# Patient Record
Sex: MALE | Race: ASIAN
[De-identification: names, ages, dates, MRNs, and addresses within clinical notes are randomized per-mention and may not be internally consistent; named-entity substitution may affect disease eponyms.]

---

## 2020-07-06 ENCOUNTER — HOSPITAL ENCOUNTER (INPATIENT)
Dept: HOSPITAL 72 - EMR | Age: 75
LOS: 6 days | DRG: 871 | End: 2020-07-12
Payer: COMMERCIAL

## 2020-07-06 VITALS — SYSTOLIC BLOOD PRESSURE: 127 MMHG | DIASTOLIC BLOOD PRESSURE: 81 MMHG

## 2020-07-06 VITALS — HEIGHT: 72 IN | WEIGHT: 179.37 LBS | BODY MASS INDEX: 24.29 KG/M2

## 2020-07-06 VITALS — SYSTOLIC BLOOD PRESSURE: 114 MMHG | DIASTOLIC BLOOD PRESSURE: 86 MMHG

## 2020-07-06 VITALS — DIASTOLIC BLOOD PRESSURE: 67 MMHG | SYSTOLIC BLOOD PRESSURE: 122 MMHG

## 2020-07-06 VITALS — DIASTOLIC BLOOD PRESSURE: 62 MMHG | SYSTOLIC BLOOD PRESSURE: 107 MMHG

## 2020-07-06 VITALS — DIASTOLIC BLOOD PRESSURE: 59 MMHG | SYSTOLIC BLOOD PRESSURE: 102 MMHG

## 2020-07-06 DIAGNOSIS — I11.0: ICD-10-CM

## 2020-07-06 DIAGNOSIS — J44.9: ICD-10-CM

## 2020-07-06 DIAGNOSIS — K56.0: ICD-10-CM

## 2020-07-06 DIAGNOSIS — E87.6: ICD-10-CM

## 2020-07-06 DIAGNOSIS — A41.9: Primary | ICD-10-CM

## 2020-07-06 DIAGNOSIS — J96.01: ICD-10-CM

## 2020-07-06 DIAGNOSIS — E86.0: ICD-10-CM

## 2020-07-06 DIAGNOSIS — I48.0: ICD-10-CM

## 2020-07-06 DIAGNOSIS — Z20.828: ICD-10-CM

## 2020-07-06 DIAGNOSIS — E43: ICD-10-CM

## 2020-07-06 DIAGNOSIS — K63.1: ICD-10-CM

## 2020-07-06 DIAGNOSIS — I51.3: ICD-10-CM

## 2020-07-06 DIAGNOSIS — R65.20: ICD-10-CM

## 2020-07-06 DIAGNOSIS — E11.9: ICD-10-CM

## 2020-07-06 DIAGNOSIS — D64.9: ICD-10-CM

## 2020-07-06 DIAGNOSIS — J18.9: ICD-10-CM

## 2020-07-06 DIAGNOSIS — N40.0: ICD-10-CM

## 2020-07-06 DIAGNOSIS — I50.33: ICD-10-CM

## 2020-07-06 DIAGNOSIS — E83.42: ICD-10-CM
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
## 2020-07-06 LAB
ADD MANUAL DIFF: NO
ALBUMIN SERPL-MCNC: 1.8 G/DL (ref 3.4–5)
ALBUMIN/GLOB SERPL: 0.3 {RATIO} (ref 1–2.7)
ALP SERPL-CCNC: 256 U/L (ref 46–116)
ALT SERPL-CCNC: 33 U/L (ref 12–78)
ANION GAP SERPL CALC-SCNC: 10 MMOL/L (ref 5–15)
APPEARANCE UR: CLEAR
APTT PPP: YELLOW S
AST SERPL-CCNC: 36 U/L (ref 15–37)
BASOPHILS NFR BLD AUTO: 1.5 % (ref 0–2)
BILIRUB SERPL-MCNC: 0.3 MG/DL (ref 0.2–1)
BUN SERPL-MCNC: 22 MG/DL (ref 7–18)
CALCIUM SERPL-MCNC: 8 MG/DL (ref 8.5–10.1)
CHLORIDE SERPL-SCNC: 98 MMOL/L (ref 98–107)
CK MB SERPL-MCNC: 2.2 NG/ML (ref 0–3.6)
CK SERPL-CCNC: 506 U/L (ref 26–308)
CO2 SERPL-SCNC: 25 MMOL/L (ref 21–32)
CREAT SERPL-MCNC: 0.9 MG/DL (ref 0.55–1.3)
EOSINOPHIL NFR BLD AUTO: 1.9 % (ref 0–3)
ERYTHROCYTE [DISTWIDTH] IN BLOOD BY AUTOMATED COUNT: 12.1 % (ref 11.6–14.8)
GLOBULIN SER-MCNC: 5.2 G/DL
GLUCOSE UR STRIP-MCNC: NEGATIVE MG/DL
HCT VFR BLD CALC: 25.4 % (ref 42–52)
HGB BLD-MCNC: 8.5 G/DL (ref 14.2–18)
KETONES UR QL STRIP: NEGATIVE
LEUKOCYTE ESTERASE UR QL STRIP: NEGATIVE
LYMPHOCYTES NFR BLD AUTO: 12 % (ref 20–45)
MCV RBC AUTO: 95 FL (ref 80–99)
MONOCYTES NFR BLD AUTO: 7.1 % (ref 1–10)
NEUTROPHILS NFR BLD AUTO: 77.7 % (ref 45–75)
NITRITE UR QL STRIP: NEGATIVE
PH UR STRIP: 7 [PH] (ref 4.5–8)
PLATELET # BLD: 611 K/UL (ref 150–450)
POTASSIUM SERPL-SCNC: 4.3 MMOL/L (ref 3.5–5.1)
PROT UR QL STRIP: (no result)
RBC # BLD AUTO: 2.67 M/UL (ref 4.7–6.1)
SODIUM SERPL-SCNC: 133 MMOL/L (ref 136–145)
SP GR UR STRIP: 1 (ref 1–1.03)
UROBILINOGEN UR-MCNC: 4 MG/DL (ref 0–1)
WBC # BLD AUTO: 6.6 K/UL (ref 4.8–10.8)

## 2020-07-06 PROCEDURE — 82962 GLUCOSE BLOOD TEST: CPT

## 2020-07-06 PROCEDURE — 74230 X-RAY XM SWLNG FUNCJ C+: CPT

## 2020-07-06 PROCEDURE — 96361 HYDRATE IV INFUSION ADD-ON: CPT

## 2020-07-06 PROCEDURE — 94660 CPAP INITIATION&MGMT: CPT

## 2020-07-06 PROCEDURE — 83880 ASSAY OF NATRIURETIC PEPTIDE: CPT

## 2020-07-06 PROCEDURE — 83735 ASSAY OF MAGNESIUM: CPT

## 2020-07-06 PROCEDURE — 86850 RBC ANTIBODY SCREEN: CPT

## 2020-07-06 PROCEDURE — 87070 CULTURE OTHR SPECIMN AEROBIC: CPT

## 2020-07-06 PROCEDURE — 99291 CRITICAL CARE FIRST HOUR: CPT

## 2020-07-06 PROCEDURE — 74018 RADEX ABDOMEN 1 VIEW: CPT

## 2020-07-06 PROCEDURE — 92610 EVALUATE SWALLOWING FUNCTION: CPT

## 2020-07-06 PROCEDURE — 93970 EXTREMITY STUDY: CPT

## 2020-07-06 PROCEDURE — 92950 HEART/LUNG RESUSCITATION CPR: CPT

## 2020-07-06 PROCEDURE — 83550 IRON BINDING TEST: CPT

## 2020-07-06 PROCEDURE — 82550 ASSAY OF CK (CPK): CPT

## 2020-07-06 PROCEDURE — 94664 DEMO&/EVAL PT USE INHALER: CPT

## 2020-07-06 PROCEDURE — 93005 ELECTROCARDIOGRAM TRACING: CPT

## 2020-07-06 PROCEDURE — 85007 BL SMEAR W/DIFF WBC COUNT: CPT

## 2020-07-06 PROCEDURE — 87205 SMEAR GRAM STAIN: CPT

## 2020-07-06 PROCEDURE — 87040 BLOOD CULTURE FOR BACTERIA: CPT

## 2020-07-06 PROCEDURE — 83540 ASSAY OF IRON: CPT

## 2020-07-06 PROCEDURE — 82270 OCCULT BLOOD FECES: CPT

## 2020-07-06 PROCEDURE — 96375 TX/PRO/DX INJ NEW DRUG ADDON: CPT

## 2020-07-06 PROCEDURE — 86901 BLOOD TYPING SEROLOGIC RH(D): CPT

## 2020-07-06 PROCEDURE — 86920 COMPATIBILITY TEST SPIN: CPT

## 2020-07-06 PROCEDURE — 81003 URINALYSIS AUTO W/O SCOPE: CPT

## 2020-07-06 PROCEDURE — 82553 CREATINE MB FRACTION: CPT

## 2020-07-06 PROCEDURE — 36600 WITHDRAWAL OF ARTERIAL BLOOD: CPT

## 2020-07-06 PROCEDURE — 87081 CULTURE SCREEN ONLY: CPT

## 2020-07-06 PROCEDURE — 80202 ASSAY OF VANCOMYCIN: CPT

## 2020-07-06 PROCEDURE — 85025 COMPLETE CBC W/AUTO DIFF WBC: CPT

## 2020-07-06 PROCEDURE — 80053 COMPREHEN METABOLIC PANEL: CPT

## 2020-07-06 PROCEDURE — 36415 COLL VENOUS BLD VENIPUNCTURE: CPT

## 2020-07-06 PROCEDURE — 82803 BLOOD GASES ANY COMBINATION: CPT

## 2020-07-06 PROCEDURE — 96365 THER/PROPH/DIAG IV INF INIT: CPT

## 2020-07-06 PROCEDURE — 84484 ASSAY OF TROPONIN QUANT: CPT

## 2020-07-06 PROCEDURE — 71045 X-RAY EXAM CHEST 1 VIEW: CPT

## 2020-07-06 PROCEDURE — 96367 TX/PROPH/DG ADDL SEQ IV INF: CPT

## 2020-07-06 PROCEDURE — 96368 THER/DIAG CONCURRENT INF: CPT

## 2020-07-06 PROCEDURE — 83605 ASSAY OF LACTIC ACID: CPT

## 2020-07-06 PROCEDURE — 86900 BLOOD TYPING SEROLOGIC ABO: CPT

## 2020-07-06 RX ADMIN — SODIUM CHLORIDE SCH MLS/HR: 9 INJECTION, SOLUTION INTRAVENOUS at 21:28

## 2020-07-06 RX ADMIN — DEXTROSE MONOHYDRATE SCH MLS/HR: 50 INJECTION, SOLUTION INTRAVENOUS at 10:31

## 2020-07-06 RX ADMIN — HUMAN INSULIN SCH MLS/HR: 100 INJECTION, SOLUTION SUBCUTANEOUS at 18:35

## 2020-07-06 RX ADMIN — PANTOPRAZOLE SODIUM SCH MG: 40 INJECTION, POWDER, FOR SOLUTION INTRAVENOUS at 08:56

## 2020-07-06 RX ADMIN — DEXTROSE MONOHYDRATE SCH MLS/HR: 50 INJECTION, SOLUTION INTRAVENOUS at 23:38

## 2020-07-06 RX ADMIN — HUMAN INSULIN SCH MLS/HR: 100 INJECTION, SOLUTION SUBCUTANEOUS at 08:53

## 2020-07-06 RX ADMIN — HEPARIN SODIUM SCH UNITS: 5000 INJECTION INTRAVENOUS; SUBCUTANEOUS at 21:30

## 2020-07-06 NOTE — EMERGENCY ROOM REPORT
History of Present Illness


General


Chief Complaint:  To Be Triaged


Source:  Medical Record, EMS





Present Illness


HPI


This is a 75-year-old [place] male from nursing home.  He has a history of COPD 

and failure to thrive.  He presents with chief complaint of shortness of breath 

and tachycardia.  Onset tonight.  Unable to get any other history from patient 

because of his condition.  History is from EMS and nursing home note.  Per EMS, 

he was tachycardic and hypoxic.  No reported cough or fever.  No nausea or 

vomiting.  He was hypoxic and was placed on a nonrebreather and brought here.


Allergies:  


Coded Allergies:  


     No Known Allergies (Unverified , 7/6/20)





COVID-19 Screening


Contact w/high risk pt:  Yes


Recent Travel to affected area:  Yes


Experienced COVID-19 symptoms?:  Yes


COVID-19 symptoms experienced:  Shortness of Breath





Patient History


Past Medical History:  see triage record, old chart reviewed, COPD


Past Surgical History:  other


Pertinent Family History:  none


Social History:  Denies: smoking


Immunizations:  other


Reviewed Nursing Documentation:  PMH: Agreed; PSxH: Agreed





Review of Systems


Constitutional:  Reports: weakness


Respiratory:  Reports: shortness of breath


All Other Systems:  limited - Secondary to condition





Physical Exam


Sp02 EP Interpretation:  abnormal


General Appearance:  moderate distress, cachetic, lethargic


Head:  normocephalic, atraumatic


Eyes:  bilateral eye PERRL, bilateral eye EOMI


ENT:  hearing grossly normal, normal pharynx


Neck:  full range of motion, supple, no meningismus


Respiratory:  chest non-tender, respiratory distress, decreased breath sounds, 

accessory muscle use


Cardiovascular #1:  regular rate, rhythm, no murmur, tachycardia


Gastrointestinal:  normal bowel sounds, non tender, no mass, no organomegaly, 

no bruit, non-distended


Musculoskeletal:  back normal, other - Contracted


Psychiatric:  normal inspection


Skin:  no rash





Procedures


Critical Care Time


Critical Care Time


Critical care is mandated in this patient who presented with sepsis from 

pneumonia.  Patient require my urgent intervention to attenuate the risks of 

metabolic collapse which may lead to cardiovascular collapse and death.  

Critical care time is 35 minutes excluding any reportable procedure.  Critical 

care time included evaluation, multiple reevaluation, looking at old charts, 

interpreting laboratory and diagnostic data, discussing case with patient and 

family and consultants, and charting.





Medical Decision Making


Diagnostic Impression:  


 Primary Impression:  


 Sepsis


 Qualified Codes:  A41.9 - Sepsis, unspecified organism; R65.20 - Severe sepsis 

without septic shock; J96.01 - Acute respiratory failure with hypoxia


 Additional Impressions:  


 HCAP (healthcare-associated pneumonia)


 Anemia


 Qualified Codes:  D64.9 - Anemia, unspecified


 Suspected COVID-19 virus infection


 Acute respiratory failure with hypoxia


ER Course


Patient presents with sepsis secondary to pneumonia.  He is high risk for COVID 

even though first rapid COVID test negative.  Antibiotic started.  His 

tachycardia is probably secondary to infection and fever.  Heart rate came down 

from 140s to 115 right now.  Blood pressure stable.  Will admit for IV fluid 

and antibiotics.  Prognosis poor.  I discussed the case with Dr. Boudreaux who 

will admit.


EKG Diagnostic Results


Rate:  tachycardiac


Rhythm:  NSR


ST Segments:  other - NSST changes





Rhythm Strip Diag. Results


EP Interpretation:  yes


Rate:  120


Rhythm:  NSR, no PVC's, no ectopy





Chest X-Ray Diagnostic Results


Chest X-Ray Diagnostic Results :  


   Chest X-Ray Ordered:  Yes


   # of Views/Limited/Complete:  1 View


   Indication:  Shortness of Breath


   EP Interpretation:  Yes


   Interpretation:  no effusion, no pneumothorax, other - copd, b/l infiltrates


   Impression:  Other - copd, b/l infiltrates


   Electronically Signed by:  Mark Bledsoe MD


Status:  improved


Disposition:  ADMITTED AS INPATIENT


Condition:  Critical











Mark Bledsoe MD Jul 6, 2020 04:05

## 2020-07-06 NOTE — CONSULTATION
DATE OF CONSULTATION:  07/06/2020

PULMONARY CONSULTATION



CONSULTING PHYSICIAN:  Cesar Calvo MD.



REASON FOR CONSULTATION:  Respiratory failure, pulmonary

infiltrates.



HISTORY OF PRESENT ILLNESS:  This is a 75-year-old nursing home patient.

The patient with history of COPD, presents with increasing shortness of

breath and significant tachycardia.  The patient unable to give any

history.  The patient was seen and evaluated in the emergency room, was

noted to be significantly hypoxemic.  No nausea or vomiting.  The patient

was placed on a non-rebreather.  Admitted to the [facility].  The patient is a

Full Code per review.  The patient's x-ray suggestive of bilateral

pulmonary infiltrates.  Rapid COVID swab was negative.  The patient was

admitted with severe sepsis at this time.  The patient's care discussed

and reviewed.  The patient was started on antibiotics.  I was called to

assist to evaluate further.  The patient unable to give any history at

this time.



PAST MEDICAL HISTORY:  Notable for COPD, chronic encephalopathy, chronic

pain syndrome, arthritis, BPH.



MEDICATIONS:  Reviewed.



ALLERGIES:  Reviewed.



SOCIAL HISTORY:  The patient is a nursing home patient, mobility status

unclear.



REVIEW OF SYSTEMS:  Unobtainable due to the patient's present

state.



FAMILY HISTORY:  Unobtainable due to the patient's present state.



PHYSICAL EXAMINATION:

VITAL SIGNS:  T-max 103.5, heart rate 110, respiratory rate 24, blood

pressure 122/67.  The patient is 100% on non-rebreather, presently

saturating 100%.

HEENT:  Overall negative.

LUNGS:  Coarse breath sounds.

CARDIAC:  Tachycardic.

ABDOMEN:  Soft.

EXTREMITIES:  No significant edema.



Remainder of exam deferred to possible COVID.



LABORATORY DATA:  Reviewed.  Hemoglobin 8.5, white count normal.

Chemistries noted.  Sodium 133.  Liver enzyme, alkaline phosphatase

elevated to 256.  Albumin is 1.8.  Atrial blood gas 7.41/32/411 on 100%.

Imaging of bilateral infiltrates.



IMPRESSION:

1. Respiratory failure.

2. Acute hypoxemia.

3. Significant pneumonia, possible COVID.

4. Severe protein-calorie malnutrition.

5. Elevated alkaline phosphatase, possibly due to bone etiology.

6. Significant anemia likely of chronic disease.

7. BPH.



RECOMMENDATIONS:  Supportive care.  IV hydration.  IV antibiotics.

Isolation for now.  DVT prophylaxis.  Consider dexamethasone.  Pending

repeat COVID, although presently negative, but may want to treat

empirically.  ID evaluation.  Venous ultrasound to evaluate further due to

immobility.  We will follow closely and monitor for changes and the

patient may need higher level of care if continues to worsened.









  ______________________________________________

  Cesar Calvo M.D.





DR:  MATTHEW

D:  07/06/2020 08:42

T:  07/06/2020 23:25

JOB#:  [id number]

CC:



MARISOL

## 2020-07-06 NOTE — CONSULTATION
DATE OF CONSULTATION:  07/06/2020

INFECTIOUS DISEASES CONSULTATION



CONSULTING PHYSICIAN:  Verónica Vale MD.



REFERRING PHYSICIAN:  Vel Boudreaux MD.



REASON FOR CONSULTATION:  Pneumonia.



HISTORY OF PRESENTING ILLNESS:  This is a 75-year-old gentleman with

history of diabetes, hypertension, status post colonoscopy and developed a

bowel perforation, was transferred back to skilled nursing facility, but

now he has had increasing fevers along with shortness of breath.  He was

found to have a pneumonia.  His rapid COVID-19 test is negative.  An

Infectious Diseases consultation has been obtained for antibiotics.



PAST MEDICAL HISTORY:

1. History of diabetes.

2. History of hypertension.

3. History of bowel perforation after colonoscopy.



SOCIAL HISTORY:  No history of smoking, alcohol, or drug use.



FAMILY HISTORY:  Unknown.



REVIEW OF SYSTEMS:  Unable to obtain currently.



MEDICATIONS:  As an inpatient, he is on IV vancomycin, Zosyn, subcutaneous

heparin, Protonix.



ALLERGIES:  No known drug allergies.



PHYSICAL EXAMINATION:

VITAL SIGNS:  Temperature of 97.3, T-max of 103.5, pulse of 110,

respiratory rate 24, blood pressure 122/67, O2 saturation of 100%.  He is

on a non-rebreather mask 15 L of oxygen.



Examination deferred due to possibility of COVID-19.



LABORATORY AND DIAGNOSTIC DATA:  White count 6.68, hemoglobin 8.5,

hematocrit 25.4, MCV 95, platelet count of 611,000 with neutrophils of

77%.  Sodium 133, potassium 4.3, chloride 98, bicarb 25, BUN 22,

creatinine 0.9, glucose 233, calcium 8.  Total bilirubin 0.3.  AST 36, ALT

33, and alkaline phosphatase 256.  CK of 506, CK-MB 2.2.  Troponin _____.

Total protein 7.  Albumin 1.8.  UA showing 0 white cells.  COVID-19 rapid

test was negative so far.  Chest x-ray showing airspace opacities within

the bilateral lower lobes and right upper lobe suspicious for multifocal

infiltrate.



ASSESSMENT:  This is a 75-year-old gentleman with history of diabetes,

hypertension, who underwent colonoscopy and had a bowel perforation and

was now found to have:



1. Possible pneumonia.

2. COVID-19 rapid test is negative.

3. Diabetes.

4. Hypertension.



PLAN:

1. Continue IV vancomycin and Zosyn.

2. Continue isolation.

3. COVID-19 test is pending.

4. We will order sputum for Gram stain and culture.

5. We will follow up cultures and adjust antibiotics accordingly.



I would like to thank, Dr. Boudreaux, for this consultation.









  ______________________________________________

  Karanuntkelin Vale M.D.





DR:  KIARRA

D:  07/06/2020 11:05

T:  07/07/2020 00:02

JOB#:  [id number]

CC:  Vel Boudreaux MD.

## 2020-07-06 NOTE — DIAGNOSTIC IMAGING REPORT
Indication: Abdominal distention

 

Technique: Supine view of the abdomen

 

Comparison: none

 

Findings: There is a weighted nasogastric feeding tube projected in the gastric

fundus. Prominent gas-filled bowel loops are seen in the mid abdomen. Uncertain as to

whether these are dilated small bowel or nondilated colon. Stool is seen in the

proximal and distal colon. No masses or unusual calcifications. Metallic objects

project over the left pelvis. Uncertain as whether internal or external.

 

Impression: Satisfactory position of nasogastric weighted feeding tube

 

Prominent bowel loops, could represent mildly dilated small bowel versus nondilated

colon. The former, nonspecific, most likely ileus but could represent small bowel

obstruction

 

Other findings as noted

 

Findings discussed by phone with Dr. Dias at the time of interpretation

## 2020-07-06 NOTE — EMERGENCY ROOM REPORT
Sepsis Event Note


Evaluation


Current Stage of Sepsis:  Sepsis


Possible Source:  Pulmonary





Focused Exam


Allergies:  


Coded Allergies:  


     No Known Allergies (Unverified , 7/6/20)


Date Exam Occurred:  Jul 6, 2020


Time Exam Occurred:  05:53


Laboratory Studies





Laboratory Tests








Test


  7/6/20


04:50


 


White Blood Count


  6.6 K/UL


(4.8-10.8)


 


Red Blood Count


  2.67 M/UL


(4.70-6.10)  L


 


Hemoglobin


  8.5 G/DL


(14.2-18.0)  L


 


Hematocrit


  25.4 %


(42.0-52.0)  L


 


Mean Corpuscular Volume 95 FL (80-99)  


 


Mean Corpuscular Hemoglobin


  31.9 PG


(27.0-31.0)  H


 


Mean Corpuscular Hemoglobin


Concent 33.6 G/DL


(32.0-36.0)


 


Red Cell Distribution Width


  12.1 %


(11.6-14.8)


 


Platelet Count


  611 K/UL


(150-450)  H


 


Mean Platelet Volume


  5.3 FL


(6.5-10.1)  L


 


Neutrophils (%) (Auto)


  77.7 %


(45.0-75.0)  H


 


Lymphocytes (%) (Auto)


  12.0 %


(20.0-45.0)  L


 


Monocytes (%) (Auto)


  7.1 %


(1.0-10.0)


 


Eosinophils (%) (Auto)


  1.9 %


(0.0-3.0)


 


Basophils (%) (Auto)


  1.5 %


(0.0-2.0)


 


Urine Color Yellow  


 


Urine Appearance Clear  


 


Urine pH 7 (4.5-8.0)  


 


Urine Specific Gravity


  1.005


(1.005-1.035)


 


Urine Protein


  1+ (NEGATIVE)


H


 


Urine Glucose (UA)


  Negative


(NEGATIVE)


 


Urine Ketones


  Negative


(NEGATIVE)


 


Urine Blood


  3+ (NEGATIVE)


H


 


Urine Nitrite


  Negative


(NEGATIVE)


 


Urine Bilirubin


  Negative


(NEGATIVE)


 


Urine Urobilinogen


  4 MG/DL


(0.0-1.0)  H


 


Urine Leukocyte Esterase


  Negative


(NEGATIVE)


 


Urine RBC


  2-4 /HPF (0 -


0)  H


 


Urine WBC


  0 /HPF (0 - 0)


 


 


Urine Squamous Epithelial


Cells Few /LPF


(NONE/OCC)


 


Urine Bacteria


  None /HPF


(NONE)


 


Sodium Level


  133 MMOL/L


(136-145)  L


 


Potassium Level


  4.3 MMOL/L


(3.5-5.1)


 


Chloride Level


  98 MMOL/L


()


 


Carbon Dioxide Level


  25 MMOL/L


(21-32)


 


Anion Gap


  10 mmol/L


(5-15)


 


Blood Urea Nitrogen


  22 mg/dL


(7-18)  H


 


Creatinine


  0.9 MG/DL


(0.55-1.30)


 


Estimat Glomerular Filtration


Rate > 60 mL/min


(>60)


 


Glucose Level


  233 MG/DL


()  H


 


Lactic Acid Level


  1.40 mmol/L


(0.4-2.0)


 


Calcium Level


  8.0 MG/DL


(8.5-10.1)  L


 


Total Bilirubin


  0.3 MG/DL


(0.2-1.0)


 


Aspartate Amino Transf


(AST/SGOT) 36 U/L (15-37)


 


 


Alanine Aminotransferase


(ALT/SGPT) 33 U/L (12-78)


 


 


Alkaline Phosphatase


  256 U/L


()  H


 


Total Creatine Kinase


  506 U/L


()  H


 


Creatine Kinase MB


  2.2 NG/ML


(0.0-3.6)


 


Creatine Kinase MB Relative


Index 0.4  


 


 


Troponin I


  0.007 ng/mL


(0.000-0.056)


 


Total Protein


  7.0 G/DL


(6.4-8.2)


 


Albumin


  1.8 G/DL


(3.4-5.0)  L


 


Globulin 5.2 g/dL  


 


Albumin/Globulin Ratio


  0.3 (1.0-2.7)


L








Vital Signs





Last 24 Hour Vital Signs








  Date Time  Temp Pulse Resp B/P (MAP) Pulse Ox O2 Delivery O2 Flow Rate FiO2


 


7/6/20 04:38  134 33   Non-Rebreather 15.0 


 


7/6/20 04:38 103.5 134 33 102/59 100 Non-Rebreather 15.0 


 


7/6/20 04:01 100.6 143 43 143/43 (76) 97 Non-Rebreather  








Respiratory Exam:  Crackles


Cardiovascular Exam:  RRR


Capillary Refill:  Less Than 2 Seconds


Peripheral Pulse:  Strong


Pulse Location:  Mark Herrera MD Jul 6, 2020 05:53

## 2020-07-06 NOTE — HISTORY AND PHYSICAL REPORT
DATE OF ADMISSION:  07/06/2020

CHIEF COMPLAINT:  Respiratory failure.



HISTORY OF PRESENT ILLNESS:  The patient is an unfortunate 75-year-old

male.  He has a history of hypertension, diabetes, failure to thrive.  He

apparently was previously hospitalized at an outside hospital where he

underwent workup for failure to thrive.  This included a colonoscopy.

During the procedure, he apparently developed a bowel perforation several

days ago.  He was transferred back to the skilled nursing facility.  He

has been on a pureed diet.  He has been doing poorly there.  On the day of

transfer here, he was noted to be febrile and short of breath and more

confused.  In the emergency room, he was febrile and tachypneic.  He was

placed on a non-rebreather.  He had x-ray evidence of diffuse pneumonia.

His rapid COVID was negative.  He is now admitted for further evaluation

and care.



PAST MEDICAL HISTORY:  As above.



PAST SURGICAL HISTORY:  Includes recent colonoscopy.



CURRENT MEDICATIONS:  Reconciled and reviewed.



ALLERGIES:  None.



FAMILY HISTORY:  None.



SOCIAL HISTORY:  There is no known history of tobacco, ethanol, or drugs.



REVIEW OF SYSTEMS:  From the patient is unobtainable as he is nonverbal.



PHYSICAL EXAMINATION:

VITAL SIGNS:  Temperature 103.5, pulse 134, respirations 33, blood pressure

102/59.

GENERAL:  The patient is chronically ill, thin male.  He was unresponsive.

Does not open his eyes.  Currently on a non-rebreather.

HEENT:  The head is normocephalic and atraumatic.  Mucous membranes are

dry.

HEART:  Regular rate and rhythm.

LUNGS:  Clear anteriorly.

ABDOMEN:  Soft, nontender, nondistended.

EXTREMITIES:  Without clubbing, cyanosis, or edema.



LABORATORY DATA:  White count 6, hemoglobin _____, platelets 611,000.

Sodium is 133, potassium 4.3, BUN 22.  CK of 506.  Troponin was negative.

UA was clear.  Chest x-ray showed diffuse bilateral infiltrates.  Rapid

COVID was negative downstairs.



ASSESSMENT:  This is a 75-year-old male with a recent history of bowel

perforation, treated conservatively, diabetes, hypertension, admitted with

respiratory failure and hypoxemia secondary to pneumonia.



PLAN:  Repeat COVID PCR.  Broad-spectrum IV antibiotics.  Pulmonary,

Infectious Disease, Cardiology, Surgery evaluations to be obtained.  We

will check a venous duplex of the legs.  The patient will be hydrated.

The patient's prognosis appears poor.  This has been discussed with the

patient's daughter, Saloni.  She will discuss with the rest of her family

about possibly making him DNR, but currently she wants to maintain Full

Code Status.









  ______________________________________________

  Vel Boudreaux M.D.





DR:  ABHINAV

D:  07/06/2020 07:21

T:  07/06/2020 21:40

JOB#:  [id number]

CC:

## 2020-07-06 NOTE — DIAGNOSTIC IMAGING REPORT
EXAM:

  XR Chest, 1 View

 

CLINICAL HISTORY:

  SOB

 

TECHNIQUE:

  Frontal view of the chest.

 

COMPARISON:

  No relevant prior studies available.

 

FINDINGS/IMPRESSION:

Severe emphysematous changes with hyperinflation.  Correlate for COPD.

 

Airspace opacities within the bilateral lower lobes and right upper lobe, 

suspicious for superimposed multifocal infiltrate.  No pneumothorax.  No 

definite pleural effusion.  

 

The heart is enlarged.  The aorta is markedly tortuous.

 

Enteric feeding tube terminates in the stomach.

## 2020-07-06 NOTE — CONSULTATION
History of Present Illness


General


Date patient seen:  Jul 6, 2020


Reason for Hospitalization:  Altered Mental Status





Present Illness


HPI


75 year old male with multiple medical comorbidities [place] [facility] [place] [facility] [place] 

care facility for evaluation of altered status, FTT, abnormal labs, tachypnea. 

  Recent history of colonoscopy with perforation per report at outside facility 

treated conservatively.  was discharged and recently in facility worsening 

condition.  surgery called to evaluate and assist with care. patient seen, 

chart reviewed, patient examined.  patient unable to provide history or 

participate in exam. ill appearing, fatigued, malnutrition.  ng feeding tube in 

place.  febrile, +SOB


Allergies:  


Coded Allergies:  


     No Known Allergies (Unverified , 7/6/20)





COVID-19 Screening


Contact w/high risk pt:  Yes


Recent Travel to affected area:  Yes


Experienced COVID-19 symptoms?:  Yes


COVID-19 symptoms experienced:  Shortness of Breath





Medication History


Scheduled


Ascorbic Acid* (Vitamin C*), 5 ML ORAL DAILY, (Reported)


Docusate Sodium* (Colace*), 100 MG ORAL DAILY, (Reported)


Ferrous Sulfate* (Ferrous Sulfate*), 330 MG ORAL DAILY, (Reported)


Finasteride* (Proscar*), 5 MG ORAL DAILY, (Reported)


Multivitamins* (Multivitamins*), 1 TAB ORAL DAILY, (Reported)


Pantoprazole* (Protonix*), 40 MG ORAL DAILY, (Reported)


Tamsulosin HCl (Flomax), 0.4 MG ORAL DAILY, (Reported)





Scheduled PRN


Hydrocodone Bit/Acetaminophen 5-325* (Norco 5-325 Tablet*), 1 TAB ORAL Q4H PRN 

for For Pain, (Reported)


Nitroglycerin 0.4MG table* (Nitroglycerin*), 0.4 MG SL .Q5MIN X 3 DOSES PRN for 

CHEST PAIN, (Reported)





Miscellaneous Medications


Mag Hydrox/Al Hydrox/Simeth (Maalox Advanced Suspension), 770 ML PO, (Reported)





Patient History


Limited by:  medical condition


History Provided By:  Medical Record, PMD


Healthcare decision maker





Resuscitation status





Advanced Directive on File








Past Medical/Surgical History


Past Medical/Surgical History:  


(1) Fever


(2) Shortness of breath


(3) Tachycardia


(4) Suspected COVID-19 virus infection


(5) Anemia


(6) Sepsis


(7) Acute respiratory failure with hypoxia


(8) HCAP (healthcare-associated pneumonia)





Review of Systems


Review of Symptoms


-C-w-v-e-r-a-l- -R-O-S--:- -n-o- -h-s-v-g-h-t- -l-o-s-s- -o-r- -f-e-v-e-r-


-E-j-j-y-u-s-l-o-g-i-c-a-l- -R-O-S--:- -n-o- -j-p-o-k-g-a-s-i-o-n- -o-r- 

-m-o-o-d- -i-m-v-n-g-e-s--,- -n-o- -m-i-t-o-r-y- -l-o-s-s-


-M-r-c-x-s-z-l-m-i-c- -R-O-S--:- -n-o- -l-w-u-u-a-l- -d-o-v-n-g-e-s- -o-r- 

-e-y-e- -m-s-s-j-q-u-t-i-o-n-


-E-N-T- -R-O-S--:- -n-o- -n-a-s-a-l- -c-a-j-l-x-h-t-i-o-n--,- -e-x-h-r-i-n-g- 

-l-o-s-s--,- -v-x-v-m-c-o-e-s-s-


-A-l-l-e-r-g-y- -a-n-d- -I-b-c-z-f-e-l-o-g-y- -R-O-S--:- -n-o- 

-a-l-l-e-r-g-i-c- -y-r-l-p-t-o-m-s- -o-r- -y-n-r-j-d-c-r-i-a-


-W-j-n-x-s-r-l-o-g-i-c-a-l- -a-n-d- -L-y-m-o-l-u-t-i-c- -R-O-S--:- -n-o- 

-u-q-q-l-l-e-n- -g-l-a-n-d-s--,- -h-t-f-s-u-a-l- -b-l-e-e-d-i-n-g- -o-r- 

-w-b-q-i-s-i-n-g-


-V-c-u-w-a-j-i-n-e- -R-O-S--:- -n-o- -p-o-l-y-u-r-i-a--,- -p-o-l-l-z-k-p-s-i-a-

-,- -b-l-m-g-h-t- -h-s-f-n-g-e-s--,- -s-r-t-r-b-t-a-t-u-r-e- 

-b-r-f-o-l-e-r-a-n-c-e-


-L-n-r-q-l-o-a-t-o-r-y- -R-O-S--:- -n-o- -c-o-u-g-h--,- -q-j-b-e-l-v-e-s-s- 

-o-f- -e-q-p-a-t-h--,- -o-r- -m-q-h-e-z-i-n-g-


-K-u-a-e-m-x-e-h-g-c-u-l-a-r- -R-O-S--:- -n-o- -c-h-e-s-t- -p-a-i-n- -o-r- 

-q-e-j-p-n-e-a- -o-n- -r-i-k-r-t-i-o-n-


-S-o-p-c-d-b-q-f-o-m-c-a-i-n-a-l- -R-O-S--:- -w-b-o-i-e-s- -z-y-j-i-j-p-n-a-l- 

-p-a-i-n--,- -o-z-t-g-h-t- -r-e-d- -b-l-o-o-d- -i-n- -s-t-o-o-l-.-


-K-r-v-c-u-l-t-p-w-e-l-e-t-a-l- -R-O-S--:- -n-o- -f-x-u-l-g-i-a-s- -o-r- 

-v-x-w-z-a-r-l-g-i-a-s-


-R-g-c-r-o-l-b-c-c-c-a-l- -R-O-S--:- -n-o- -T-I-A- -o-r- -z-x-h-o-k-e- 

-h-b-h-p-t-o-m-s-


-C-b-z-i-u-x-o-l-o-g-i-c-a-l- -R-O-S--:- -n-o- -n-e-w- -o-r- -u-e-q-n-g-i-n-g- 

-s-k-i-n- -l-e-s-i-o-n-s--,- -u-h-v-h-e-s- -o-r- -u-t-x-r-i-t-i-s-


unable to obtain given medical condition





Physical Exam


Physical Exam


General appearance:  mild distress, appears stated age


Head:  Normocephalic, without obvious abnormality, atraumatic, feeding tube in 

nares 


Eyes:  conjunctivae/corneas clear. PERRL, EOM's intact. Fundi benign


Throat:  Lips, mucosa, and tongue normal. Teeth and gums normal


Neck:  supple, symmetrical, trachea midline, no adenopathy, thyroid: not 

enlarged, symmetric, no tenderness/mass/nodules, no carotid bruit and no JVD


Lungs:  clear to auscultation bilaterally


Heart:  regular rate and rhythm, S1, S2 normal, no murmur, click, rub or gallop


Abdomen:  soft, non-tender. Bowel sounds normal. No masses,  no organomegaly


Extremities:  extremities normal, atraumatic, no cyanosis or edema


Pulses:   symmetric


Skin:  Skin color, texture, turgor normal. No rashes or lesions


Neurologic:  Grossly normal





Last 24 Hour Vital Signs








  Date Time  Temp Pulse Resp B/P (MAP) Pulse Ox O2 Delivery O2 Flow Rate FiO2


 


7/6/20 08:00 97.3 110 24 122/67 (85) 100   


 


7/6/20 06:15 101.0 118 29 116/61 100 Non-Rebreather 15.0 


 


7/6/20 04:38  134 33   Non-Rebreather 15.0 


 


7/6/20 04:38 103.5 134 33 102/59 100 Non-Rebreather 15.0 


 


7/6/20 04:36 101.0       


 


7/6/20 04:01 100.6 143 43 143/43 (76) 97 Non-Rebreather  

















Intake and Output  


 


 7/5/20 7/6/20





 19:00 07:00


 


Intake Total  1055 ml


 


Balance  1055 ml


 


  


 


Intake Oral  0 ml


 


IV Total  1055 ml











Laboratory Tests








Test


  7/6/20


04:50 7/6/20


07:32


 


White Blood Count


  6.6 K/UL


(4.8-10.8) 


 


 


Red Blood Count


  2.67 M/UL


(4.70-6.10)  L 


 


 


Hemoglobin


  8.5 G/DL


(14.2-18.0)  L 


 


 


Hematocrit


  25.4 %


(42.0-52.0)  L 


 


 


Mean Corpuscular Volume 95 FL (80-99)   


 


Mean Corpuscular Hemoglobin


  31.9 PG


(27.0-31.0)  H 


 


 


Mean Corpuscular Hemoglobin


Concent 33.6 G/DL


(32.0-36.0) 


 


 


Red Cell Distribution Width


  12.1 %


(11.6-14.8) 


 


 


Platelet Count


  611 K/UL


(150-450)  H 


 


 


Mean Platelet Volume


  5.3 FL


(6.5-10.1)  L 


 


 


Neutrophils (%) (Auto)


  77.7 %


(45.0-75.0)  H 


 


 


Lymphocytes (%) (Auto)


  12.0 %


(20.0-45.0)  L 


 


 


Monocytes (%) (Auto)


  7.1 %


(1.0-10.0) 


 


 


Eosinophils (%) (Auto)


  1.9 %


(0.0-3.0) 


 


 


Basophils (%) (Auto)


  1.5 %


(0.0-2.0) 


 


 


Urine Color Yellow   


 


Urine Appearance Clear   


 


Urine pH 7 (4.5-8.0)   


 


Urine Specific Gravity


  1.005


(1.005-1.035) 


 


 


Urine Protein


  1+ (NEGATIVE)


H 


 


 


Urine Glucose (UA)


  Negative


(NEGATIVE) 


 


 


Urine Ketones


  Negative


(NEGATIVE) 


 


 


Urine Blood


  3+ (NEGATIVE)


H 


 


 


Urine Nitrite


  Negative


(NEGATIVE) 


 


 


Urine Bilirubin


  Negative


(NEGATIVE) 


 


 


Urine Urobilinogen


  4 MG/DL


(0.0-1.0)  H 


 


 


Urine Leukocyte Esterase


  Negative


(NEGATIVE) 


 


 


Urine RBC


  2-4 /HPF (0 -


0)  H 


 


 


Urine WBC


  0 /HPF (0 - 0)


  


 


 


Urine Squamous Epithelial


Cells Few /LPF


(NONE/OCC) 


 


 


Urine Bacteria


  None /HPF


(NONE) 


 


 


Sodium Level


  133 MMOL/L


(136-145)  L 


 


 


Potassium Level


  4.3 MMOL/L


(3.5-5.1) 


 


 


Chloride Level


  98 MMOL/L


() 


 


 


Carbon Dioxide Level


  25 MMOL/L


(21-32) 


 


 


Anion Gap


  10 mmol/L


(5-15) 


 


 


Blood Urea Nitrogen


  22 mg/dL


(7-18)  H 


 


 


Creatinine


  0.9 MG/DL


(0.55-1.30) 


 


 


Estimat Glomerular Filtration


Rate > 60 mL/min


(>60) 


 


 


Glucose Level


  233 MG/DL


()  H 


 


 


Lactic Acid Level


  1.40 mmol/L


(0.4-2.0) 


 


 


Calcium Level


  8.0 MG/DL


(8.5-10.1)  L 


 


 


Total Bilirubin


  0.3 MG/DL


(0.2-1.0) 


 


 


Aspartate Amino Transf


(AST/SGOT) 36 U/L (15-37)


  


 


 


Alanine Aminotransferase


(ALT/SGPT) 33 U/L (12-78)


  


 


 


Alkaline Phosphatase


  256 U/L


()  H 


 


 


Total Creatine Kinase


  506 U/L


()  H 


 


 


Creatine Kinase MB


  2.2 NG/ML


(0.0-3.6) 


 


 


Creatine Kinase MB Relative


Index 0.4  


  


 


 


Troponin I


  0.007 ng/mL


(0.000-0.056) 


 


 


Total Protein


  7.0 G/DL


(6.4-8.2) 


 


 


Albumin


  1.8 G/DL


(3.4-5.0)  L 


 


 


Globulin 5.2 g/dL   


 


Albumin/Globulin Ratio


  0.3 (1.0-2.7)


L 


 


 


Arterial Blood pH


  


  7.418


(7.350-7.450)


 


Arterial Blood Partial


Pressure CO2 


  31.9 mmHg


(35.0-45.0)  L


 


Arterial Blood Partial


Pressure O2 


  411.0 mmHg


(75.0-100.0)  H


 


Arterial Blood HCO3


  


  20.1 mmol/L


(22.0-26.0)  L


 


Arterial Blood Oxygen


Saturation 


  99.5 %


()


 


Arterial Blood Base Excess  -3.8 (-2-2)  L


 


Alexandr Test  Positive  











Microbiology








 Date/Time


Source Procedure


Growth Status


 


 


 7/6/20 04:50


Nasopharynx SARS-CoV-2 RdRp Gene Assay - Final Complete


 


 7/6/20 04:00


Rectum  Received








Height (Feet):  6


Weight (Pounds):  180


Medications





Current Medications








 Medications


  (Trade)  Dose


 Ordered  Sig/Aron


 Route


 PRN Reason  Start Time


 Stop Time Status Last Admin


Dose Admin


 


 Dextrose/Sodium


 Chloride  1,000 ml @ 


 100 mls/hr  Q10H


 IV


   7/6/20 07:30


 8/5/20 07:29  7/6/20 08:53


 


 


 Heparin Sodium


  (Porcine)


  (Heparin 5000


 units/ml)  5,000 units  EVERY 12  HOURS


 SUBQ


   7/6/20 21:00


 8/20/20 20:59   


 


 


 Pantoprazole


  (Protonix)  40 mg  DAILY


 IVP


   7/6/20 09:00


 8/5/20 08:59  7/6/20 08:56


 


 


 Piperacillin Sod/


 Tazobactam Sod


 3.375 gm/Sodium


 Chloride  110 ml @ 


 27.5 mls/hr  EVERY 8  HOURS


 IVPB


   7/6/20 10:30


 7/13/20 10:29  7/6/20 10:31


 


 


 Vancomycin HCl


  ([facility]


 to dose)  1 ea  DAILY  PRN


 MISC


 Per rx protocol  7/6/20 07:15


 8/5/20 07:14   


 


 


 Vancomycin HCl


 750 mg/Sodium


 Chloride  275 ml @ 


 183.333


 mls/hr  Q12HR


 IVPB


   7/6/20 21:00


 7/11/20 20:59   


 











Assessment/Plan


Problem List:  


(1) Anemia


ICD Codes:  D64.9 - Anemia, unspecified


SNOMED:  198773424, 145796787


Qualifiers:  


   Qualified Codes:  D64.9 - Anemia, unspecified


(2) Sepsis


Assessment & Plan:  febrile, tachycardic, abnormal labs, sob, respiratory 

decline


covid neg in ED


recent colonoscopy with perf per report treated conservatively


abd exam limited given medical condition but seemingly benign


non distended, no pain upon exam





CXR noted.  


labs reviewed





KUB pending 


does not seem to be having acute abdominal process but concerning and ddx given 

recent history


no acute surgical intervention planned


will follow with serial exams and recs


thank you


ICD Codes:  A41.9 - Sepsis, unspecified organism


SNOMED:  06691764, 608101215


Qualifiers:  


   Qualified Codes:  A41.9 - Sepsis, unspecified organism; R65.20 - Severe 

sepsis without septic shock; J96.01 - Acute respiratory failure with hypoxia


(3) Acute respiratory failure with hypoxia


ICD Codes:  J96.01 - Acute respiratory failure with hypoxia


SNOMED:  57565607, 186066594


(4) HCAP (healthcare-associated pneumonia)


ICD Codes:  J18.9 - Pneumonia, unspecified organism


SNOMED:  203050965, 998758726


(5) Fever


ICD Codes:  R50.9 - Fever, unspecified


SNOMED:  726325202


(6) Shortness of breath


ICD Codes:  R06.02 - Shortness of breath


SNOMED:  361583747


(7) Tachycardia


ICD Codes:  R00.0 - Tachycardia, unspecified


SNOMED:  3184362


(8) Suspected COVID-19 virus infection


ICD Codes:  Z20.828 - Contact with and (suspected) exposure to other viral 

communicable diseases


SNOMED:  222113150











Anthony Dias Jul 6, 2020 11:51

## 2020-07-07 VITALS — SYSTOLIC BLOOD PRESSURE: 115 MMHG | DIASTOLIC BLOOD PRESSURE: 65 MMHG

## 2020-07-07 VITALS — DIASTOLIC BLOOD PRESSURE: 78 MMHG | SYSTOLIC BLOOD PRESSURE: 126 MMHG

## 2020-07-07 VITALS — DIASTOLIC BLOOD PRESSURE: 45 MMHG | SYSTOLIC BLOOD PRESSURE: 125 MMHG

## 2020-07-07 VITALS — DIASTOLIC BLOOD PRESSURE: 74 MMHG | SYSTOLIC BLOOD PRESSURE: 119 MMHG

## 2020-07-07 VITALS — SYSTOLIC BLOOD PRESSURE: 133 MMHG | DIASTOLIC BLOOD PRESSURE: 84 MMHG

## 2020-07-07 VITALS — DIASTOLIC BLOOD PRESSURE: 73 MMHG | SYSTOLIC BLOOD PRESSURE: 127 MMHG

## 2020-07-07 LAB
ADD MANUAL DIFF: YES
ERYTHROCYTE [DISTWIDTH] IN BLOOD BY AUTOMATED COUNT: 12.2 % (ref 11.6–14.8)
HCT VFR BLD CALC: 23.8 % (ref 42–52)
HGB BLD-MCNC: 7.7 G/DL (ref 14.2–18)
MCV RBC AUTO: 96 FL (ref 80–99)
PLATELET # BLD: 603 K/UL (ref 150–450)
RBC # BLD AUTO: 2.48 M/UL (ref 4.7–6.1)
WBC # BLD AUTO: 5.3 K/UL (ref 4.8–10.8)

## 2020-07-07 RX ADMIN — INSULIN ASPART SCH UNITS: 100 INJECTION, SOLUTION INTRAVENOUS; SUBCUTANEOUS at 16:30

## 2020-07-07 RX ADMIN — INSULIN ASPART SCH UNITS: 100 INJECTION, SOLUTION INTRAVENOUS; SUBCUTANEOUS at 11:30

## 2020-07-07 RX ADMIN — HUMAN INSULIN SCH MLS/HR: 100 INJECTION, SOLUTION SUBCUTANEOUS at 08:35

## 2020-07-07 RX ADMIN — DEXTROSE MONOHYDRATE SCH MLS/HR: 50 INJECTION, SOLUTION INTRAVENOUS at 13:12

## 2020-07-07 RX ADMIN — PANTOPRAZOLE SODIUM SCH MG: 40 INJECTION, POWDER, FOR SOLUTION INTRAVENOUS at 08:33

## 2020-07-07 RX ADMIN — SODIUM CHLORIDE SCH MLS/HR: 9 INJECTION, SOLUTION INTRAVENOUS at 08:34

## 2020-07-07 RX ADMIN — HUMAN INSULIN SCH MLS/HR: 100 INJECTION, SOLUTION SUBCUTANEOUS at 22:43

## 2020-07-07 RX ADMIN — HEPARIN SODIUM SCH UNITS: 5000 INJECTION INTRAVENOUS; SUBCUTANEOUS at 08:34

## 2020-07-07 RX ADMIN — INSULIN ASPART SCH UNITS: 100 INJECTION, SOLUTION INTRAVENOUS; SUBCUTANEOUS at 21:00

## 2020-07-07 RX ADMIN — DEXTROSE MONOHYDRATE SCH MLS/HR: 50 INJECTION, SOLUTION INTRAVENOUS at 22:43

## 2020-07-07 RX ADMIN — HUMAN INSULIN SCH MLS/HR: 100 INJECTION, SOLUTION SUBCUTANEOUS at 04:03

## 2020-07-07 RX ADMIN — SODIUM CHLORIDE SCH MLS/HR: 9 INJECTION, SOLUTION INTRAVENOUS at 20:39

## 2020-07-07 RX ADMIN — DEXTROSE MONOHYDRATE SCH MLS/HR: 50 INJECTION, SOLUTION INTRAVENOUS at 06:06

## 2020-07-07 NOTE — SURGERY PROGRESS NOTE
Surgery Progress Note


Subjective


Additional Comments


no acute events


kub discussed with radiology.  likely ileus


no n/v/f/c


cxr this AM reviewed at bedside


maria teresa jones





Objective





Last 24 Hour Vital Signs








  Date Time  Temp Pulse Resp B/P (MAP) Pulse Ox O2 Delivery O2 Flow Rate FiO2


 


7/7/20 08:00 97.3 106 20 126/78 (94) 100   


 


7/7/20 08:00  103      


 


7/7/20 04:00 97.7 70 20 119/74 (89) 98   


 


7/7/20 04:00      Non-Rebreather  


 


7/7/20 03:36  105      


 


7/7/20 00:00      Non-Rebreather  


 


7/7/20 00:00 97.8 111 22 133/84 (100) 100   


 


7/7/20 00:00  109      


 


7/6/20 20:00 97.7 108 16 114/86 (95) 100   


 


7/6/20 20:00      Non-Rebreather  


 


7/6/20 19:04  104      


 


7/6/20 16:10      Non-Rebreather  


 


7/6/20 16:08 96.8 110 19 127/81 (96) 100   


 


7/6/20 16:00  96      


 


7/6/20 12:00 96.8 103 21 107/62 (77) 100   


 


7/6/20 12:00  98      


 


7/6/20 12:00      Non-Rebreather  








I&O











Intake and Output  


 


 7/6/20 7/7/20





 19:00 07:00


 


Intake Total 924.167 ml 1581.670 ml


 


Output Total 1100 ml 700 ml


 


Balance -175.833 ml 881.670 ml


 


  


 


Intake Oral 0 ml 0 ml


 


IV Total 924.167 ml 1581.670 ml


 


Output Urine Total 1100 ml 700 ml








Dressing:  other


Wound:  other


Drains:  other


Cardiovascular:  RSR


Respiratory:  decreased breath sounds


Abdomen:  soft, distended - mild, non-tender, present bowel sounds


Extremities:  no edema, no tenderness, no cyanosis





Laboratory Tests








Test


  7/7/20


09:00


 


White Blood Count


  5.3 K/UL


(4.8-10.8)


 


Red Blood Count


  2.48 M/UL


(4.70-6.10)  L


 


Hemoglobin


  7.7 G/DL


(14.2-18.0)  L


 


Hematocrit


  23.8 %


(42.0-52.0)  L


 


Mean Corpuscular Volume 96 FL (80-99)  


 


Mean Corpuscular Hemoglobin


  31.3 PG


(27.0-31.0)  H


 


Mean Corpuscular Hemoglobin


Concent 32.5 G/DL


(32.0-36.0)


 


Red Cell Distribution Width


  12.2 %


(11.6-14.8)


 


Platelet Count


  603 K/UL


(150-450)  H


 


Mean Platelet Volume


  5.3 FL


(6.5-10.1)  L


 


Neutrophils (%) (Auto)


  % (45.0-75.0)


 


 


Lymphocytes (%) (Auto)


  % (20.0-45.0)


 


 


Monocytes (%) (Auto)  % (1.0-10.0)  


 


Eosinophils (%) (Auto)  % (0.0-3.0)  


 


Basophils (%) (Auto)  % (0.0-2.0)  


 


Differential Total Cells


Counted 100  


 


 


Neutrophils % (Manual) 80 % (45-75)  H


 


Lymphocytes % (Manual) 14 % (20-45)  L


 


Monocytes % (Manual) 6 % (1-10)  


 


Eosinophils % (Manual) 0 % (0-3)  


 


Basophils % (Manual) 0 % (0-2)  


 


Band Neutrophils 0 % (0-8)  


 


Platelet Estimate Increased  H


 


Platelet Morphology Normal  


 


Hypochromasia 1+  











Plan


Problems:  


(1) Anemia


(2) Sepsis


Assessment & Plan:  febrile, tachycardic, abnormal labs, sob, respiratory 

decline


covid neg in ED


recent colonoscopy with perf per report treated conservatively


abd exam limited given medical condition but seemingly benign


non distended, no pain upon exam





CXR noted.  


labs reviewed





KUB pending 


does not seem to be having acute abdominal process but concerning and ddx given 

recent history


no acute surgical intervention planned


will follow with serial exams and recs


thank you 





KUB noted.. . likely ileus


repeat in AM


hold feeds for now


IV fluids


discussed with team 





(3) Acute respiratory failure with hypoxia


(4) HCAP (healthcare-associated pneumonia)


(5) Fever


(6) Shortness of breath


(7) Tachycardia


(8) Suspected COVID-19 virus infection











Anthony Dias Jul 7, 2020 09:58

## 2020-07-07 NOTE — DIAGNOSTIC IMAGING REPORT
Indication: Reason For Exam: DVT

 

Technique: Grayscale and duplex images of the  bilateral lower extremity veins

 

Comparison: None

 

Findings: Bilaterally,   grayscale and duplex images demonstrate no evidence of

intraluminal thrombus. Normal phasic Doppler waveforms, demonstrating normal

augmentation response and no evidence of valvular insufficiency. Greater saphenous

vein(s) and tibial veins are patent. Normal compressibility. 

 

Impression: Negative for evidence of lower extremity deep venous thrombosis 

bilaterally

## 2020-07-07 NOTE — PULMONOLOGY PROGRESS NOTE
Subjective


Allergies:  


Coded Allergies:  


     No Known Allergies (Unverified , 7/6/20)


Subjective


overnight events reviewed


care noted


on oxygen





Objective





Last 24 Hour Vital Signs








  Date Time  Temp Pulse Resp B/P (MAP) Pulse Ox O2 Delivery O2 Flow Rate FiO2


 


7/7/20 04:00 97.7 70 20 119/74 (89) 98   


 


7/7/20 04:00      Non-Rebreather  


 


7/7/20 03:36  105      


 


7/7/20 00:00      Non-Rebreather  


 


7/7/20 00:00 97.8 111 22 133/84 (100) 100   


 


7/7/20 00:00  109      


 


7/6/20 20:00 97.7 108 16 114/86 (95) 100   


 


7/6/20 20:00      Non-Rebreather  


 


7/6/20 19:04  104      


 


7/6/20 16:10      Non-Rebreather  


 


7/6/20 16:08 96.8 110 19 127/81 (96) 100   


 


7/6/20 16:00  96      


 


7/6/20 12:00 96.8 103 21 107/62 (77) 100   


 


7/6/20 12:00  98      


 


7/6/20 12:00      Non-Rebreather  

















Intake and Output  


 


 7/6/20 7/7/20





 19:00 07:00


 


Intake Total 924.167 ml 1581.670 ml


 


Output Total 1100 ml 700 ml


 


Balance -175.833 ml 881.670 ml


 


  


 


Intake Oral 0 ml 0 ml


 


IV Total 924.167 ml 1581.670 ml


 


Output Urine Total 1100 ml 700 ml








Objective


deferred due to COVID+








Microbiology








 Date/Time


Source Procedure


Growth Status


 


 


 7/6/20 04:50


Nasopharynx SARS-CoV-2 RdRp Gene Assay - Final Complete


 


 7/6/20 04:00


Rectum  Received











Current Medications








 Medications


  (Trade)  Dose


 Ordered  Sig/Aron


 Route


 PRN Reason  Start Time


 Stop Time Status Last Admin


Dose Admin


 


 Dextrose/Sodium


 Chloride  1,000 ml @ 


 100 mls/hr  Q10H


 IV


   7/6/20 07:30


 8/5/20 07:29  7/7/20 04:03


 


 


 Heparin Sodium


  (Porcine)


  (Heparin 5000


 units/ml)  5,000 units  EVERY 12  HOURS


 SUBQ


   7/6/20 21:00


 8/20/20 20:59  7/6/20 21:30


 


 


 Pantoprazole


  (Protonix)  40 mg  DAILY


 IVP


   7/6/20 09:00


 8/5/20 08:59  7/6/20 08:56


 


 


 Piperacillin Sod/


 Tazobactam Sod


 3.375 gm/Sodium


 Chloride  110 ml @ 


 27.5 mls/hr  EVERY 8  HOURS


 IVPB


   7/6/20 10:30


 7/13/20 10:29  7/7/20 06:06


 


 


 Vancomycin HCl


  ([facility]


 to dose)  1 ea  DAILY  PRN


 MISC


 Per rx protocol  7/6/20 07:15


 8/5/20 07:14   


 


 


 Vancomycin HCl


 750 mg/Sodium


 Chloride  275 ml @ 


 183.333


 mls/hr  Q12HR


 IVPB


   7/6/20 21:00


 7/11/20 20:59  7/6/20 21:28


 











Assessment/Plan


Assessment/Plan





IMPRESSION:


1. Respiratory failure.


2. Acute hypoxemia.


3. Significant pneumonia, possible COVID.


4. Severe protein-calorie malnutrition.


5. Elevated alkaline phosphatase, 


6. Significant anemia likely of chronic disease.


7. BPH.





PLAN


care noted


imaging reviewed


on oxygen 


respiratory care 


on isolation 


DVT prophylaxis


impression, plan, and exam edited and reviewed in detail


care discussed with Cesar Winn MD Jul 7, 2020 08:19

## 2020-07-07 NOTE — GENERAL PROGRESS NOTE
Assessment/Plan


Problem List:  


(1) Bowel perforation


ICD Codes:  K63.1 - Perforation of intestine (nontraumatic)


SNOMED:  22879590


(2) Anemia


ICD Codes:  D64.9 - Anemia, unspecified


SNOMED:  363024495, 158659317


Qualifiers:  


   Qualified Codes:  D64.9 - Anemia, unspecified


(3) Sepsis


ICD Codes:  A41.9 - Sepsis, unspecified organism


SNOMED:  67461816, 755352743


Qualifiers:  


   Qualified Codes:  A41.9 - Sepsis, unspecified organism; R65.20 - Severe 

sepsis without septic shock; J96.01 - Acute respiratory failure with hypoxia


(4) Tachycardia


ICD Codes:  R00.0 - Tachycardia, unspecified


SNOMED:  4717007


(5) Fever


ICD Codes:  R50.9 - Fever, unspecified


SNOMED:  619306160


(6) HCAP (healthcare-associated pneumonia)


ICD Codes:  J18.9 - Pneumonia, unspecified organism


SNOMED:  652805511, [id number]


(7) Acute respiratory failure with hypoxia


ICD Codes:  J96.01 - Acute respiratory failure with hypoxia


SNOMED:  81258706, [id number]


Status:  stable


Assessment/Plan:


stable


cont current rx


wean o2


resp rx


suctioning as needed


IVF


monitor h/h


check iron panel and stool ob


may need transfusion tomorrow


PPI


No chest compressions. Intubation OK per DTR





Subjective


ROS Limited/Unobtainable:  No


Constitutional:  Reports: malaise, weakness


HEENT:  Reports: no symptoms


Cardiovascular:  Reports: no symptoms


Respiratory:  Reports: cough, SOB with excertion, SOB at rest


Gastrointestinal/Abdominal:  Reports: no symptoms


Genitourinary:  Reports: no symptoms


Neurologic/Psychiatric:  Reports: emotional problems, pre-existing deficit


Endocrine:  Reports: no symptoms


Hematologic/Lymphatic:  Reports: no symptoms


Allergies:  


Coded Allergies:  


     No Known Allergies (Unverified , 7/6/20)


All Systems:  reviewed and negative except above


Subjective


no events. stable on 2L NC. poorly responsive. no fever or chills. +sob but 

seems less congestion. off venti mask now. 


decreased h/h noted. no bleeding.





Objective





Last 24 Hour Vital Signs








  Date Time  Temp Pulse Resp B/P (MAP) Pulse Ox O2 Delivery O2 Flow Rate FiO2


 


7/7/20 15:56  111      


 


7/7/20 12:00 97.7 106 20 127/73 (91) 100   


 


7/7/20 12:00  105      


 


7/7/20 12:00      Nasal Cannula 2.0 


 


7/7/20 11:30 97.7 106 20 125/45 (71) 100   


 


7/7/20 08:00      Nasal Cannula 2.0 


 


7/7/20 08:00 97.3 106 20 126/78 (94) 100   


 


7/7/20 08:00  103      


 


7/7/20 04:00 97.7 70 20 119/74 (89) 98   


 


7/7/20 04:00      Non-Rebreather  


 


7/7/20 03:36  105      


 


7/7/20 00:00      Non-Rebreather  


 


7/7/20 00:00 97.8 111 22 133/84 (100) 100   


 


7/7/20 00:00  109      


 


7/6/20 20:00 97.7 108 16 114/86 (95) 100   


 


7/6/20 20:00      Non-Rebreather  


 


7/6/20 19:04  104      

















Intake and Output  


 


 7/6/20 7/7/20





 19:00 07:00


 


Intake Total 924.167 ml 1581.670 ml


 


Output Total 1100 ml 700 ml


 


Balance -175.833 ml 881.670 ml


 


  


 


Intake Oral 0 ml 0 ml


 


IV Total 924.167 ml 1581.670 ml


 


Output Urine Total 1100 ml 700 ml








Laboratory Tests


7/7/20 09:00: 


White Blood Count 5.3, Red Blood Count 2.48L, Hemoglobin 7.7L, Hematocrit 23.8L

, Mean Corpuscular Volume 96, Mean Corpuscular Hemoglobin 31.3H, Mean 

Corpuscular Hemoglobin Concent 32.5, Red Cell Distribution Width 12.2, Platelet 

Count 603H, Mean Platelet Volume 5.3L, Neutrophils (%) (Auto) , Lymphocytes (%) 

(Auto) , Monocytes (%) (Auto) , Eosinophils (%) (Auto) , Basophils (%) (Auto) , 

Differential Total Cells Counted 100, Neutrophils % (Manual) 80H, Lymphocytes % 

(Manual) 14L, Monocytes % (Manual) 6, Eosinophils % (Manual) 0, Basophils % (

Manual) 0, Band Neutrophils 0, Platelet Estimate IncreasedH, Platelet 

Morphology Normal, Hypochromasia 1+


7/7/20 11:41: POC Whole Blood Glucose 153H


Height (Feet):  6


Weight (Pounds):  180


General Appearance:  WD/WN, lethargic, confused, cachetic


EENT:  PERRL/EOMI, normal ENT inspection


Neck:  non-tender, normal alignment


Cardiovascular:  normal rate, regular rhythm


Respiratory/Chest:  no respiratory distress, no accessory muscle use, rhonchi - 

bilaterally


Abdomen:  normal bowel sounds, non tender, soft, no organomegaly, no mass


Edema:  no edema noted Arm (L), no edema noted Arm (R), no edema noted Leg (L), 

no edema noted Leg (R), no edema noted Pedal (L), no edema noted Pedal (R), no 

edema noted Generalized


Neurologic:  disoriented, aphasia


Skin:  normal pigmentation


Lymphatic:  normal anterior cervical (L), normal anterior cervical (R)











Vel Boudreaux MD Jul 7, 2020 16:26

## 2020-07-07 NOTE — INFECTIOUS DISEASES PROG NOTE
Assessment/Plan


Assessment/Plan


antibiotics : vancomycin iv, zosyn





A


1. pneumonia


on 2 liters O2, 100 percent saturation


COVID 19 rapid test negative


2. diabetes mellitus


3. hypertension


4. bowel perforation





P


1. continue iv vancomycin, zosyn


2. COVID 19 test pending


3. continue isolation





Subjective


ROS Limited/Unobtainable:  Yes


Allergies:  


Coded Allergies:  


     No Known Allergies (Unverified , 7/6/20)





Objective





Last 24 Hour Vital Signs








  Date Time  Temp Pulse Resp B/P (MAP) Pulse Ox O2 Delivery O2 Flow Rate FiO2


 


7/7/20 08:00      Nasal Cannula 2.0 


 


7/7/20 08:00 97.3 106 20 126/78 (94) 100   


 


7/7/20 08:00  103      


 


7/7/20 04:00 97.7 70 20 119/74 (89) 98   


 


7/7/20 04:00      Non-Rebreather  


 


7/7/20 03:36  105      


 


7/7/20 00:00      Non-Rebreather  


 


7/7/20 00:00 97.8 111 22 133/84 (100) 100   


 


7/7/20 00:00  109      


 


7/6/20 20:00 97.7 108 16 114/86 (95) 100   


 


7/6/20 20:00      Non-Rebreather  


 


7/6/20 19:04  104      


 


7/6/20 16:10      Non-Rebreather  


 


7/6/20 16:08 96.8 110 19 127/81 (96) 100   


 


7/6/20 16:00  96      


 


7/6/20 12:00 96.8 103 21 107/62 (77) 100   


 


7/6/20 12:00  98      


 


7/6/20 12:00      Non-Rebreather  








Height (Feet):  6


Weight (Pounds):  180





Microbiology








 Date/Time


Source Procedure


Growth Status


 


 


 7/6/20 04:50


Nasopharynx SARS-CoV-2 RdRp Gene Assay - Final Complete


 


 7/6/20 04:00


Rectum  Received











Laboratory Tests








Test


  7/7/20


09:00


 


White Blood Count


  5.3 K/UL


(4.8-10.8)


 


Red Blood Count


  2.48 M/UL


(4.70-6.10)  L


 


Hemoglobin


  7.7 G/DL


(14.2-18.0)  L


 


Hematocrit


  23.8 %


(42.0-52.0)  L


 


Mean Corpuscular Volume 96 FL (80-99)  


 


Mean Corpuscular Hemoglobin


  31.3 PG


(27.0-31.0)  H


 


Mean Corpuscular Hemoglobin


Concent 32.5 G/DL


(32.0-36.0)


 


Red Cell Distribution Width


  12.2 %


(11.6-14.8)


 


Platelet Count


  603 K/UL


(150-450)  H


 


Mean Platelet Volume


  5.3 FL


(6.5-10.1)  L


 


Neutrophils (%) (Auto)


  % (45.0-75.0)


 


 


Lymphocytes (%) (Auto)


  % (20.0-45.0)


 


 


Monocytes (%) (Auto)  % (1.0-10.0)  


 


Eosinophils (%) (Auto)  % (0.0-3.0)  


 


Basophils (%) (Auto)  % (0.0-2.0)  


 


Differential Total Cells


Counted 100  


 


 


Neutrophils % (Manual) 80 % (45-75)  H


 


Lymphocytes % (Manual) 14 % (20-45)  L


 


Monocytes % (Manual) 6 % (1-10)  


 


Eosinophils % (Manual) 0 % (0-3)  


 


Basophils % (Manual) 0 % (0-2)  


 


Band Neutrophils 0 % (0-8)  


 


Platelet Estimate Increased  H


 


Platelet Morphology Normal  


 


Hypochromasia 1+  











Current Medications








 Medications


  (Trade)  Dose


 Ordered  Sig/Aron


 Route


 PRN Reason  Start Time


 Stop Time Status Last Admin


Dose Admin


 


 Dextrose


  (Dextrose 50%)  25 ml  Q30M  PRN


 IV


 Hypoglycemia  7/7/20 09:45


 10/5/20 09:44   


 


 


 Dextrose


  (Dextrose 50%)  50 ml  Q30M  PRN


 IV


 Hypoglycemia  7/7/20 09:45


 10/5/20 09:44   


 


 


 Dextrose/Sodium


 Chloride  1,000 ml @ 


 100 mls/hr  Q10H


 IV


   7/6/20 07:30


 8/5/20 07:29  7/7/20 08:35


 


 


 Heparin Sodium


  (Porcine)


  (Heparin 5000


 units/ml)  5,000 units  EVERY 12  HOURS


 SUBQ


   7/6/20 21:00


 8/20/20 20:59  7/6/20 21:30


 


 


 Insulin Aspart


  (NovoLOG)    BEFORE MEALS AND  HS


 SUBQ


   7/7/20 11:30


 10/5/20 11:29   


 


 


 Pantoprazole


  (Protonix)  40 mg  DAILY


 IVP


   7/6/20 09:00


 8/5/20 08:59  7/7/20 08:33


 


 


 Piperacillin Sod/


 Tazobactam Sod


 3.375 gm/Sodium


 Chloride  110 ml @ 


 27.5 mls/hr  EVERY 8  HOURS


 IVPB


   7/6/20 10:30


 7/13/20 10:29  7/7/20 06:06


 


 


 Vancomycin HCl


  ([facility]


 to dose)  1 ea  DAILY  PRN


 MISC


 Per rx protocol  7/6/20 07:15


 8/5/20 07:14   


 


 


 Vancomycin HCl


 750 mg/Sodium


 Chloride  275 ml @ 


 183.333


 mls/hr  Q12HR


 IVPB


   7/6/20 21:00


 7/11/20 20:59  7/7/20 08:34


 

















Verónica Vale MD Jul 7, 2020 10:29

## 2020-07-07 NOTE — DIAGNOSTIC IMAGING REPORT
Indication: Tortuous of breath

 

Technique: One view of the chest

 

Comparison: 7/6/2020

 

Findings: Stable satisfactory position of weighted feeding tube. Diffuse right lung

infiltrates and left perihilar and basilar infiltrates are unchanged. The heart size

is normal. The pleural spaces are clear

 

Impression:  Unchanged, over one day, findings as above.

## 2020-07-08 VITALS — SYSTOLIC BLOOD PRESSURE: 148 MMHG | DIASTOLIC BLOOD PRESSURE: 67 MMHG

## 2020-07-08 VITALS — DIASTOLIC BLOOD PRESSURE: 75 MMHG | SYSTOLIC BLOOD PRESSURE: 148 MMHG

## 2020-07-08 VITALS — DIASTOLIC BLOOD PRESSURE: 75 MMHG | SYSTOLIC BLOOD PRESSURE: 140 MMHG

## 2020-07-08 VITALS — DIASTOLIC BLOOD PRESSURE: 67 MMHG | SYSTOLIC BLOOD PRESSURE: 123 MMHG

## 2020-07-08 VITALS — SYSTOLIC BLOOD PRESSURE: 123 MMHG | DIASTOLIC BLOOD PRESSURE: 67 MMHG

## 2020-07-08 VITALS — DIASTOLIC BLOOD PRESSURE: 89 MMHG | SYSTOLIC BLOOD PRESSURE: 150 MMHG

## 2020-07-08 LAB
% IRON SATURATION: 20 % (ref 15–50)
ADD MANUAL DIFF: NO
ALBUMIN SERPL-MCNC: 1.5 G/DL (ref 3.4–5)
ALBUMIN/GLOB SERPL: 0.4 {RATIO} (ref 1–2.7)
ALP SERPL-CCNC: 162 U/L (ref 46–116)
ALT SERPL-CCNC: 32 U/L (ref 12–78)
ANION GAP SERPL CALC-SCNC: 11 MMOL/L (ref 5–15)
AST SERPL-CCNC: 39 U/L (ref 15–37)
BILIRUB SERPL-MCNC: 0.2 MG/DL (ref 0.2–1)
BUN SERPL-MCNC: 15 MG/DL (ref 7–18)
CALCIUM SERPL-MCNC: 7.5 MG/DL (ref 8.5–10.1)
CHLORIDE SERPL-SCNC: 111 MMOL/L (ref 98–107)
CO2 SERPL-SCNC: 23 MMOL/L (ref 21–32)
CREAT SERPL-MCNC: 0.7 MG/DL (ref 0.55–1.3)
ERYTHROCYTE [DISTWIDTH] IN BLOOD BY AUTOMATED COUNT: 12.3 % (ref 11.6–14.8)
GLOBULIN SER-MCNC: 4.2 G/DL
HCT VFR BLD CALC: 22.5 % (ref 42–52)
HGB BLD-MCNC: 7.3 G/DL (ref 14.2–18)
IRON SERPL-MCNC: 25 UG/DL (ref 50–175)
MCV RBC AUTO: 96 FL (ref 80–99)
PLATELET # BLD: 618 K/UL (ref 150–450)
POTASSIUM SERPL-SCNC: 2.8 MMOL/L (ref 3.5–5.1)
RBC # BLD AUTO: 2.33 M/UL (ref 4.7–6.1)
SODIUM SERPL-SCNC: 144 MMOL/L (ref 136–145)
TIBC SERPL-MCNC: 124 UG/DL (ref 250–450)
UNSATURATED IRON BINDING: 99 UG/DL (ref 112–346)
WBC # BLD AUTO: 6.9 K/UL (ref 4.8–10.8)

## 2020-07-08 RX ADMIN — SODIUM CHLORIDE SCH MLS/HR: 9 INJECTION, SOLUTION INTRAVENOUS at 19:22

## 2020-07-08 RX ADMIN — INSULIN ASPART SCH UNITS: 100 INJECTION, SOLUTION INTRAVENOUS; SUBCUTANEOUS at 17:51

## 2020-07-08 RX ADMIN — HUMAN INSULIN SCH MLS/HR: 100 INJECTION, SOLUTION SUBCUTANEOUS at 19:23

## 2020-07-08 RX ADMIN — DEXTROSE MONOHYDRATE SCH MLS/HR: 50 INJECTION, SOLUTION INTRAVENOUS at 17:35

## 2020-07-08 RX ADMIN — DEXTROSE MONOHYDRATE SCH MLS/HR: 50 INJECTION, SOLUTION INTRAVENOUS at 06:18

## 2020-07-08 RX ADMIN — INSULIN ASPART SCH UNITS: 100 INJECTION, SOLUTION INTRAVENOUS; SUBCUTANEOUS at 11:30

## 2020-07-08 RX ADMIN — HUMAN INSULIN SCH MLS/HR: 100 INJECTION, SOLUTION SUBCUTANEOUS at 09:30

## 2020-07-08 RX ADMIN — DEXTROSE MONOHYDRATE SCH MLS/HR: 50 INJECTION, SOLUTION INTRAVENOUS at 22:29

## 2020-07-08 RX ADMIN — SODIUM CHLORIDE SCH MLS/HR: 0.9 INJECTION INTRAVENOUS at 20:21

## 2020-07-08 RX ADMIN — SODIUM CHLORIDE SCH MLS/HR: 9 INJECTION, SOLUTION INTRAVENOUS at 06:17

## 2020-07-08 RX ADMIN — INSULIN ASPART SCH UNITS: 100 INJECTION, SOLUTION INTRAVENOUS; SUBCUTANEOUS at 20:20

## 2020-07-08 RX ADMIN — INSULIN ASPART SCH UNITS: 100 INJECTION, SOLUTION INTRAVENOUS; SUBCUTANEOUS at 06:18

## 2020-07-08 RX ADMIN — PANTOPRAZOLE SODIUM SCH MG: 40 INJECTION, POWDER, FOR SOLUTION INTRAVENOUS at 10:06

## 2020-07-08 NOTE — DIAGNOSTIC IMAGING REPORT
Indication: Abdominal pain

 

Technique: Supine view of the abdomen

 

Comparison: 7/6/2020

 

Findings: Weighted nasogastric feeding tube is coiled in the gastric fundus. Bowel

gas pattern is unremarkable. Overall bowel gas amount has decreased. 2 metallic

objects projected in the pelvis, as previously. Increased density in the lower pelvis

may indicate bladder distention.

 

Impression: No acute abnormality. Overall decreased bowel gas since previous exam of

2 days earlier

## 2020-07-08 NOTE — GENERAL PROGRESS NOTE
Assessment/Plan


Problem List:  


(1) Bowel perforation


ICD Codes:  K63.1 - Perforation of intestine (nontraumatic)


SNOMED:  99359333


(2) Anemia


ICD Codes:  D64.9 - Anemia, unspecified


SNOMED:  027554445, 496281905


Qualifiers:  


   Qualified Codes:  D64.9 - Anemia, unspecified


(3) Sepsis


ICD Codes:  A41.9 - Sepsis, unspecified organism


SNOMED:  68590745, 715835888


Qualifiers:  


   Qualified Codes:  A41.9 - Sepsis, unspecified organism; R65.20 - Severe 

sepsis without septic shock; J96.01 - Acute respiratory failure with hypoxia


(4) Tachycardia


ICD Codes:  R00.0 - Tachycardia, unspecified


SNOMED:  8885026


(5) Fever


ICD Codes:  R50.9 - Fever, unspecified


SNOMED:  399086301


(6) HCAP (healthcare-associated pneumonia)


ICD Codes:  J18.9 - Pneumonia, unspecified organism


SNOMED:  929696867, 465915758


(7) Acute respiratory failure with hypoxia


ICD Codes:  J96.01 - Acute respiratory failure with hypoxia


SNOMED:  95142892, 194904438


Status:  stable


Assessment/Plan:


stable/improving


cont current rx


wean o2


resp rx


suctioning as needed


IVF


monitor h/h


check iron panel and stool ob


transfuse 1 unit- d/w dtr she agrees. risk and benefits d/w dtr


PPI


No chest compressions. Intubation OK per DTR





Subjective


ROS Limited/Unobtainable:  Yes


Constitutional:  Reports: malaise, weakness


HEENT:  Reports: no symptoms


Cardiovascular:  Reports: no symptoms


Respiratory:  Reports: shortness of breath


Gastrointestinal/Abdominal:  Reports: no symptoms


Genitourinary:  Reports: no symptoms


Neurologic/Psychiatric:  Reports: no symptoms


Endocrine:  Reports: no symptoms


Hematologic/Lymphatic:  Reports: no symptoms


Allergies:  


Coded Allergies:  


     No Known Allergies (Unverified , 7/6/20)


All Systems:  reviewed and negative except above


Subjective


no events. low k and h/h noted. no reports of bleeding. more alert. smiles. on 

iv abx. still with infiltrates on cxr





Objective





Last 24 Hour Vital Signs








  Date Time  Temp Pulse Resp B/P (MAP) Pulse Ox O2 Delivery O2 Flow Rate FiO2


 


7/8/20 04:00 97.3 107 22 123/67 (85) 97   


 


7/8/20 04:00      Nasal Cannula 2.0 


 


7/8/20 04:00  111      


 


7/8/20 00:00      Nasal Cannula 2.0 


 


7/8/20 00:00 97.3 101 22 123/67 (85) 97   


 


7/8/20 00:00  109      


 


7/7/20 20:00      Nasal Cannula 2.0 


 


7/7/20 20:00 98.3 104 20 115/65 (82) 100   


 


7/7/20 16:00 97.9 109 20 115/65 (82) 100   


 


7/7/20 16:00      Nasal Cannula 2.0 


 


7/7/20 15:56  111      


 


7/7/20 12:00 97.7 106 20 127/73 (91) 100   


 


7/7/20 12:00  105      


 


7/7/20 12:00      Nasal Cannula 2.0 


 


7/7/20 11:30 97.7 106 20 125/45 (71) 100   

















Intake and Output  


 


 7/7/20 7/8/20





 19:00 07:00


 


Output Total 600 ml 700 ml


 


Balance -600 ml -700 ml


 


  


 


Output Urine Total 600 ml 700 ml








Laboratory Tests


7/7/20 09:00: 


White Blood Count 5.3, Red Blood Count 2.48L, Hemoglobin 7.7L, Hematocrit 23.8L

, Mean Corpuscular Volume 96, Mean Corpuscular Hemoglobin 31.3H, Mean 

Corpuscular Hemoglobin Concent 32.5, Red Cell Distribution Width 12.2, Platelet 

Count 603H, Mean Platelet Volume 5.3L, Neutrophils (%) (Auto) , Lymphocytes (%) 

(Auto) , Monocytes (%) (Auto) , Eosinophils (%) (Auto) , Basophils (%) (Auto) , 

Differential Total Cells Counted 100, Neutrophils % (Manual) 80H, Lymphocytes % 

(Manual) 14L, Monocytes % (Manual) 6, Eosinophils % (Manual) 0, Basophils % (

Manual) 0, Band Neutrophils 0, Platelet Estimate IncreasedH, Platelet 

Morphology Normal, Hypochromasia 1+


7/7/20 11:41: POC Whole Blood Glucose 153H


7/7/20 17:23: POC Whole Blood Glucose [Pending]


7/7/20 20:00: Vancomycin Level Trough 11.0


7/7/20 20:42: POC Whole Blood Glucose 107H


7/7/20 21:34: POC Whole Blood Glucose [Pending]


7/8/20 04:35: 


White Blood Count 6.9, Red Blood Count 2.33L, Hemoglobin 7.3L, Hematocrit 22.5L

, Mean Corpuscular Volume 96, Mean Corpuscular Hemoglobin 31.2H, Mean 

Corpuscular Hemoglobin Concent 32.4, Red Cell Distribution Width 12.3, Platelet 

Count 618H, Mean Platelet Volume 5.2L, Neutrophils (%) (Auto) , Lymphocytes (%) 

(Auto) , Monocytes (%) (Auto) , Eosinophils (%) (Auto) , Basophils (%) (Auto) , 

Sodium Level 144, Potassium Level 2.8L, Chloride Level 111H, Carbon Dioxide 

Level 23, Anion Gap 11, Blood Urea Nitrogen 15, Creatinine 0.7, Estimat 

Glomerular Filtration Rate > 60, Glucose Level 134H, Calcium Level 7.5L, 

Magnesium Level 1.7L, Iron Level 25L, Total Iron Binding Capacity 124L, Percent 

Iron Saturation 20, Unsaturated Iron Binding 99L, Total Bilirubin 0.2, 

Aspartate Amino Transf (AST/SGOT) 39H, Alanine Aminotransferase (ALT/SGPT) 32, 

Alkaline Phosphatase 162H, Total Protein 5.7L, Albumin 1.5L, Globulin 4.2, 

Albumin/Globulin Ratio 0.4L


Height (Feet):  6


Weight (Pounds):  180


Objective


General Appearance:  WD/WN, lethargic, confused, cachetic


EENT:  PERRL/EOMI, normal ENT inspection


Neck:  non-tender, normal alignment


Cardiovascular:  normal rate, regular rhythm


Respiratory/Chest:  no respiratory distress, no accessory muscle use, rhonchi - 

bilaterally


Abdomen:  normal bowel sounds, non tender, soft, no organomegaly, no mass


Edema:  no edema noted Arm (L), no edema noted Arm (R), no edema noted Leg (L), 

no edema noted Leg (R), no edema noted Pedal (L), no edema noted Pedal (R), no 

edema noted Generalized


Neurologic:  disoriented, aphasia


Skin:  normal pigmentation


Lymphatic:  normal anterior cervical (L), normal anterior cervical (R)











Vel Boudreaux MD Jul 8, 2020 08:56

## 2020-07-08 NOTE — INFECTIOUS DISEASES PROG NOTE
Assessment/Plan


Assessment/Plan


antibiotics : vancomycin iv, zosyn





A


1. pneumonia


COVID 19 rapid test negative x 2


2. diabetes mellitus


3. hypertension


4. bowel perforation





P


1. continue iv vancomycin, zosyn


2. sputum culture


3. will follow up cultures


4. d.c isolation





Subjective


ROS Limited/Unobtainable:  Yes


Allergies:  


Coded Allergies:  


     No Known Allergies (Unverified , 7/6/20)





Objective





Last 24 Hour Vital Signs








  Date Time  Temp Pulse Resp B/P (MAP) Pulse Ox O2 Delivery O2 Flow Rate FiO2


 


7/8/20 04:00 97.3 107 22 123/67 (85) 97   


 


7/8/20 04:00      Nasal Cannula 2.0 


 


7/8/20 04:00  111      


 


7/8/20 00:00      Nasal Cannula 2.0 


 


7/8/20 00:00 97.3 101 22 123/67 (85) 97   


 


7/8/20 00:00  109      


 


7/7/20 20:00      Nasal Cannula 2.0 


 


7/7/20 20:00 98.3 104 20 115/65 (82) 100   


 


7/7/20 16:00 97.9 109 20 115/65 (82) 100   


 


7/7/20 16:00      Nasal Cannula 2.0 


 


7/7/20 15:56  111      


 


7/7/20 12:00 97.7 106 20 127/73 (91) 100   


 


7/7/20 12:00  105      


 


7/7/20 12:00      Nasal Cannula 2.0 


 


7/7/20 11:30 97.7 106 20 125/45 (71) 100   








Height (Feet):  6


Weight (Pounds):  180


Respiratory/Chest:  lungs clear


Cardiovascular:  normal rate, regular rhythm, no gallop/murmur


Abdomen:  soft, non tender


Extremities:  no edema





Microbiology








 Date/Time


Source Procedure


Growth Status


 


 


 7/6/20 04:00


Blood Blood Culture - Preliminary


NO GROWTH AFTER 24 HOURS Resulted


 


 7/6/20 03:45


Blood Blood Culture - Preliminary


NO GROWTH AFTER 24 HOURS Resulted


 


 7/6/20 16:00


Nasopharynx Coronavirus COVID-19 PCR ([facility]) - Final Complete


 


 7/6/20 04:50


Nasopharynx SARS-CoV-2 RdRp Gene Assay - Final Complete


 


 7/6/20 04:00


Nasal Nares MRSA Culture - Final


NO METHICILLIN RESISTANT STAPH AUREUS... Complete


 


 7/6/20 04:00


Rectum VRE Culture - Final


NO VANCOMYCIN RESISTANT ENTEROCOCCUS ... Complete


 


 7/6/20 04:00


Rectum - Final


NO CARBAPENEM-RESISTANT ENTEROBACTERI... Complete











Laboratory Tests








Test


  7/7/20


11:41 7/7/20


17:23 7/7/20


20:00 7/7/20


20:42


 


POC Whole Blood Glucose


  153 MG/DL


()  H Pending  


  


  107 MG/DL


()  H


 


Vancomycin Level Trough


  


  


  11.0 ug/mL


(5.0-12.0) 


 


 


Test


  7/7/20


21:34 7/8/20


04:35 


  


 


 


POC Whole Blood Glucose Pending     


 


White Blood Count


  


  6.9 K/UL


(4.8-10.8) 


  


 


 


Red Blood Count


  


  2.33 M/UL


(4.70-6.10)  L 


  


 


 


Hemoglobin


  


  7.3 G/DL


(14.2-18.0)  L 


  


 


 


Hematocrit


  


  22.5 %


(42.0-52.0)  L 


  


 


 


Mean Corpuscular Volume  96 FL (80-99)    


 


Mean Corpuscular Hemoglobin


  


  31.2 PG


(27.0-31.0)  H 


  


 


 


Mean Corpuscular Hemoglobin


Concent 


  32.4 G/DL


(32.0-36.0) 


  


 


 


Red Cell Distribution Width


  


  12.3 %


(11.6-14.8) 


  


 


 


Platelet Count


  


  618 K/UL


(150-450)  H 


  


 


 


Mean Platelet Volume


  


  5.2 FL


(6.5-10.1)  L 


  


 


 


Neutrophils (%) (Auto)


  


  % (45.0-75.0)


  


  


 


 


Lymphocytes (%) (Auto)


  


  % (20.0-45.0)


  


  


 


 


Monocytes (%) (Auto)   % (1.0-10.0)    


 


Eosinophils (%) (Auto)   % (0.0-3.0)    


 


Basophils (%) (Auto)   % (0.0-2.0)    


 


Sodium Level


  


  144 MMOL/L


(136-145) 


  


 


 


Potassium Level


  


  2.8 MMOL/L


(3.5-5.1)  L 


  


 


 


Chloride Level


  


  111 MMOL/L


()  H 


  


 


 


Carbon Dioxide Level


  


  23 MMOL/L


(21-32) 


  


 


 


Anion Gap


  


  11 mmol/L


(5-15) 


  


 


 


Blood Urea Nitrogen


  


  15 mg/dL


(7-18) 


  


 


 


Creatinine


  


  0.7 MG/DL


(0.55-1.30) 


  


 


 


Estimat Glomerular Filtration


Rate 


  > 60 mL/min


(>60) 


  


 


 


Glucose Level


  


  134 MG/DL


()  H 


  


 


 


Calcium Level


  


  7.5 MG/DL


(8.5-10.1)  L 


  


 


 


Magnesium Level


  


  1.7 MG/DL


(1.8-2.4)  L 


  


 


 


Iron Level


  


  25 ug/dL


()  L 


  


 


 


Total Iron Binding Capacity


  


  124 ug/dL


(250-450)  L 


  


 


 


Percent Iron Saturation  20 % (15-50)    


 


Unsaturated Iron Binding


  


  99 ug/dL


(112-346)  L 


  


 


 


Total Bilirubin


  


  0.2 MG/DL


(0.2-1.0) 


  


 


 


Aspartate Amino Transf


(AST/SGOT) 


  39 U/L (15-37)


H 


  


 


 


Alanine Aminotransferase


(ALT/SGPT) 


  32 U/L (12-78)


  


  


 


 


Alkaline Phosphatase


  


  162 U/L


()  H 


  


 


 


Total Protein


  


  5.7 G/DL


(6.4-8.2)  L 


  


 


 


Albumin


  


  1.5 G/DL


(3.4-5.0)  L 


  


 


 


Globulin  4.2 g/dL    


 


Albumin/Globulin Ratio


  


  0.4 (1.0-2.7)


L 


  


 











Current Medications








 Medications


  (Trade)  Dose


 Ordered  Sig/Aron


 Route


 PRN Reason  Start Time


 Stop Time Status Last Admin


Dose Admin


 


 Dextrose


  (Dextrose 50%)  25 ml  Q30M  PRN


 IV


 Hypoglycemia  7/7/20 09:45


 10/5/20 09:44   


 


 


 Dextrose


  (Dextrose 50%)  50 ml  Q30M  PRN


 IV


 Hypoglycemia  7/7/20 09:45


 10/5/20 09:44   


 


 


 Dextrose/Sodium


 Chloride  1,000 ml @ 


 100 mls/hr  Q10H


 IV


   7/6/20 07:30


 8/5/20 07:29  7/8/20 09:30


 


 


 Insulin Aspart


  (NovoLOG)    BEFORE MEALS AND  HS


 SUBQ


   7/7/20 11:30


 10/5/20 11:29   


 


 


 Iron Sucrose 100


 mg/Sodium Chloride  60 ml @ 


 240 mls/hr  BEDTIME


 IVPB


   7/8/20 21:00


 7/12/20 21:14   


 


 


 Magnesium Sulfate  100 ml @ 


 100 mls/hr  Q1H


 IVPB


   7/8/20 13:00


 7/8/20 14:59   


 


 


 Pantoprazole


  (Protonix)  40 mg  DAILY


 IVP


   7/6/20 09:00


 8/5/20 08:59  7/8/20 10:06


 


 


 Piperacillin Sod/


 Tazobactam Sod


 3.375 gm/Sodium


 Chloride  110 ml @ 


 27.5 mls/hr  EVERY 8  HOURS


 IVPB


   7/6/20 10:30


 7/13/20 10:29  7/8/20 06:18


 


 


 Potassium Chloride  100 ml @ 


 50 mls/hr  ONCE  ONCE


 IVPB


   7/8/20 15:00


 7/8/20 16:59   


 


 


 Vancomycin HCl


  ([facility]


 to dose)  1 ea  DAILY  PRN


 MISC


 Per rx protocol  7/6/20 07:15


 8/5/20 07:14   


 


 


 Vancomycin HCl 1


 gm/Sodium Chloride  275 ml @ 


 183.708


 mls/hr  Q12HR@0600,1800


 IVPB


   7/8/20 06:00


 7/13/20 05:59  7/8/20 06:17


 

















Verónica Vale MD Jul 8, 2020 10:28

## 2020-07-08 NOTE — CONSULTATION
DATE OF CONSULTATION:  07/06/2020

CARDIOLOGY CONSULTATION



CONSULTING PHYSICIAN:  Theron Mckenzie MD.



REASON FOR CONSULTATION:  Severe sepsis and respiratory failure.



HISTORY OF PRESENT ILLNESS:  This is a 75-year-old male residing in a

skilled nursing facility and recently hospitalized for complications of

abdominal perforation was transferred to this emergency room for

evaluation of acute shortness of breath and fever.  In the emergency room,

he was placed on a non-rebreather mask.  Initial rapid COVID-19 swab was

negative.  In view of his acuity of his presentation and severity of his

condition, cardiovascular consultation has been requested.



PAST MEDICAL HISTORY:

1. Type 2 diabetes mellitus.

2. Prostatic hypertrophy.

3. Hypertension.

4. Cerebrovascular disease.

5. Anemia.



ALLERGIES:  None known.



MEDICATIONS:  Prior to admission, reviewed and reconciled.



FAMILY HISTORY:  Noncontributory.



SOCIAL HISTORY:  No record available regarding prior history of smoking,

alcohol, or substance abuse.  Presently, resides in a skilled nursing

facility.



REVIEW OF SYSTEMS:  Again, not obtainable from patient due to the severity

of his condition.  Hospital and nursing home records have been reviewed

with 20 minutes time spent and all available pertinent data has been

documented above.



PHYSICAL EXAMINATION:

VITAL SIGNS:  Temperature 103.5, blood pressure 102/59, heart rate 134,

respiratory rate 33, oxygen saturation on a non-rebreather mask at 15

liters is 100%.

GENERAL:  Ill-appearing.  Moderate respiratory distress.

HEENT:  Temporal wasting.

LUNGS:  Accessory muscle use.  Bilateral rales and rhonchi.

CARDIAC:  Regular rhythm.  Rapid rate.  Normal S1, S2.

ABDOMEN:  Slightly distended, but soft.  No guarding or rebound.

EXTREMITIES:  Without edema.  Distal pulses palpable.  Capillary refill is

diminished.  No mottling.  Lethargic, but moves all extremities.



LABORATORY DATA:  White count 6.6, hemoglobin 8.5.  ABG 7.42, 32, 411.

Troponin is negative.  Lactic acid normal.  Albumin 1.8.  , MB

negative.  Liver function within normal limits.  BUN 22, creatinine 0.9,

bicarb 25, sodium 133, potassium 4.3.



IMPRESSION:

1. Respiratory failure.

2. Severe sepsis.

3. Hypoxia.

4. Sinus tachycardia.

5. Fevers.

6. Hypovolemia and dehydration.

7. Healthcare-acquired pneumonia.

8. Possible ileus.

9. Condition critical.

10. Prognosis guarded.



PLAN:

1. Cardiac monitoring.

2. IV antimicrobials.

3. Oxygenation.

4. Venous duplex scan.

5. Volume support.

6. NPO for now.

7. We will follow and make recommendations as needed.









  ______________________________________________

  Theron Mckenzie M.D.





DR:  MIKIE

D:  07/08/2020 00:12

T:  07/08/2020 00:47

JOB#:  [id number]

CC:

## 2020-07-08 NOTE — PROGRESS NOTE
DATE:  07/07/2020

CARDIOLOGY PROGRESS NOTE



SUBJECTIVE:  Patient remains with shortness of breath.  Abdominal

distention and signs of radiographic signs of ileus is noted.  No acute

perforation seen or free air.



PHYSICAL EXAMINATION:

GENERAL:  Blood pressure 127/73, heart rate 106, respirations 20, afebrile,

oxygen saturation 100% on 2 liters now.

LUNGS:  Bilateral rales.

CARDIAC:  Regular rhythm and rate.  Normal S1, S2.

ABDOMEN:  Distended and soft.

EXTREMITIES:  No edema.



LABORATORY DATA:  White count 5.3, hemoglobin 7.7.  Lactic acid

1.4.



IMPRESSION:

1. Healthcare-associated pneumonia.

2. Ileus.

3. Severe sepsis.

4. Anemia.

5. Sinus tachycardia.

6. Type 2 diabetes mellitus.



PLAN:

1. Antimicrobials.

2. Bowel rest.

3. Hydration.

4. May need transfusion.

5. Insulin coverage by sliding scale.

6. DVT prophylaxis.

7. Serial imaging studies of the abdomen.

8. NPO for now.









  ______________________________________________

  Theron Mckenzie M.D.





DR:  MIKIE

D:  07/08/2020 00:18

T:  07/08/2020 00:41

JOB#:  [id number]

CC:

## 2020-07-08 NOTE — SURGERY PROGRESS NOTE
Surgery Progress Note


Subjective


Additional Comments


no acute events


comfortable


stable


no n/v/f/c





Objective





Last 24 Hour Vital Signs








  Date Time  Temp Pulse Resp B/P (MAP) Pulse Ox O2 Delivery O2 Flow Rate FiO2


 


7/8/20 04:00 97.3 107 22 123/67 (85) 97   


 


7/8/20 04:00      Nasal Cannula 2.0 


 


7/8/20 04:00  111      


 


7/8/20 00:00      Nasal Cannula 2.0 


 


7/8/20 00:00 97.3 101 22 123/67 (85) 97   


 


7/8/20 00:00  109      


 


7/7/20 20:00      Nasal Cannula 2.0 


 


7/7/20 20:00 98.3 104 20 115/65 (82) 100   


 


7/7/20 16:00 97.9 109 20 115/65 (82) 100   


 


7/7/20 16:00      Nasal Cannula 2.0 


 


7/7/20 15:56  111      


 


7/7/20 12:00 97.7 106 20 127/73 (91) 100   


 


7/7/20 12:00  105      


 


7/7/20 12:00      Nasal Cannula 2.0 


 


7/7/20 11:30 97.7 106 20 125/45 (71) 100   








I&O











Intake and Output  


 


 7/7/20 7/8/20





 19:00 07:00


 


Output Total 600 ml 700 ml


 


Balance -600 ml -700 ml


 


  


 


Output Urine Total 600 ml 700 ml








Cardiovascular:  RSR


Respiratory:  clear, decreased breath sounds


Abdomen:  soft, non-tender, present bowel sounds


Extremities:  no cyanosis





Laboratory Tests








Test


  7/7/20


11:41 7/7/20


17:23 7/7/20


20:00 7/7/20


20:42


 


POC Whole Blood Glucose


  153 MG/DL


()  H Pending  


  


  107 MG/DL


()  H


 


Vancomycin Level Trough


  


  


  11.0 ug/mL


(5.0-12.0) 


 


 


Test


  7/7/20


21:34 7/8/20


04:35 


  


 


 


POC Whole Blood Glucose Pending     


 


White Blood Count


  


  6.9 K/UL


(4.8-10.8) 


  


 


 


Red Blood Count


  


  2.33 M/UL


(4.70-6.10)  L 


  


 


 


Hemoglobin


  


  7.3 G/DL


(14.2-18.0)  L 


  


 


 


Hematocrit


  


  22.5 %


(42.0-52.0)  L 


  


 


 


Mean Corpuscular Volume  96 FL (80-99)    


 


Mean Corpuscular Hemoglobin


  


  31.2 PG


(27.0-31.0)  H 


  


 


 


Mean Corpuscular Hemoglobin


Concent 


  32.4 G/DL


(32.0-36.0) 


  


 


 


Red Cell Distribution Width


  


  12.3 %


(11.6-14.8) 


  


 


 


Platelet Count


  


  618 K/UL


(150-450)  H 


  


 


 


Mean Platelet Volume


  


  5.2 FL


(6.5-10.1)  L 


  


 


 


Neutrophils (%) (Auto)


  


  % (45.0-75.0)


  


  


 


 


Lymphocytes (%) (Auto)


  


  % (20.0-45.0)


  


  


 


 


Monocytes (%) (Auto)   % (1.0-10.0)    


 


Eosinophils (%) (Auto)   % (0.0-3.0)    


 


Basophils (%) (Auto)   % (0.0-2.0)    


 


Sodium Level


  


  144 MMOL/L


(136-145) 


  


 


 


Potassium Level


  


  2.8 MMOL/L


(3.5-5.1)  L 


  


 


 


Chloride Level


  


  111 MMOL/L


()  H 


  


 


 


Carbon Dioxide Level


  


  23 MMOL/L


(21-32) 


  


 


 


Anion Gap


  


  11 mmol/L


(5-15) 


  


 


 


Blood Urea Nitrogen


  


  15 mg/dL


(7-18) 


  


 


 


Creatinine


  


  0.7 MG/DL


(0.55-1.30) 


  


 


 


Estimat Glomerular Filtration


Rate 


  > 60 mL/min


(>60) 


  


 


 


Glucose Level


  


  134 MG/DL


()  H 


  


 


 


Calcium Level


  


  7.5 MG/DL


(8.5-10.1)  L 


  


 


 


Magnesium Level


  


  1.7 MG/DL


(1.8-2.4)  L 


  


 


 


Iron Level


  


  25 ug/dL


()  L 


  


 


 


Total Iron Binding Capacity


  


  124 ug/dL


(250-450)  L 


  


 


 


Percent Iron Saturation  20 % (15-50)    


 


Unsaturated Iron Binding


  


  99 ug/dL


(112-346)  L 


  


 


 


Total Bilirubin


  


  0.2 MG/DL


(0.2-1.0) 


  


 


 


Aspartate Amino Transf


(AST/SGOT) 


  39 U/L (15-37)


H 


  


 


 


Alanine Aminotransferase


(ALT/SGPT) 


  32 U/L (12-78)


  


  


 


 


Alkaline Phosphatase


  


  162 U/L


()  H 


  


 


 


Total Protein


  


  5.7 G/DL


(6.4-8.2)  L 


  


 


 


Albumin


  


  1.5 G/DL


(3.4-5.0)  L 


  


 


 


Globulin  4.2 g/dL    


 


Albumin/Globulin Ratio


  


  0.4 (1.0-2.7)


L 


  


 











Plan


Problems:  


(1) Anemia


(2) Sepsis


Assessment & Plan:  febrile, tachycardic, abnormal labs, sob, respiratory 

decline


covid neg in ED


recent colonoscopy with perf per report treated conservatively


abd exam limited given medical condition but seemingly benign


non distended, no pain upon exam





CXR noted.  


labs reviewed





KUB pending 


does not seem to be having acute abdominal process but concerning and ddx given 

recent history


no acute surgical intervention planned


will follow with serial exams and recs


thank you 





HILDA noted.. . likely ileus


repeat in AM


hold feeds for now


IV fluids


discussed with team 





(3) Acute respiratory failure with hypoxia


(4) HCAP (healthcare-associated pneumonia)


(5) Fever


(6) Shortness of breath


(7) Tachycardia


(8) Suspected COVID-19 virus infection











Anthony Dias Jul 8, 2020 09:17

## 2020-07-09 VITALS — DIASTOLIC BLOOD PRESSURE: 75 MMHG | SYSTOLIC BLOOD PRESSURE: 128 MMHG

## 2020-07-09 VITALS — DIASTOLIC BLOOD PRESSURE: 74 MMHG | SYSTOLIC BLOOD PRESSURE: 138 MMHG

## 2020-07-09 VITALS — DIASTOLIC BLOOD PRESSURE: 86 MMHG | SYSTOLIC BLOOD PRESSURE: 145 MMHG

## 2020-07-09 VITALS — DIASTOLIC BLOOD PRESSURE: 74 MMHG | SYSTOLIC BLOOD PRESSURE: 134 MMHG

## 2020-07-09 VITALS — DIASTOLIC BLOOD PRESSURE: 76 MMHG | SYSTOLIC BLOOD PRESSURE: 132 MMHG

## 2020-07-09 VITALS — DIASTOLIC BLOOD PRESSURE: 82 MMHG | SYSTOLIC BLOOD PRESSURE: 139 MMHG

## 2020-07-09 LAB
ADD MANUAL DIFF: NO
ALBUMIN SERPL-MCNC: 1.6 G/DL (ref 3.4–5)
ALBUMIN/GLOB SERPL: 0.4 {RATIO} (ref 1–2.7)
ALP SERPL-CCNC: 166 U/L (ref 46–116)
ALT SERPL-CCNC: 33 U/L (ref 12–78)
ANION GAP SERPL CALC-SCNC: 10 MMOL/L (ref 5–15)
AST SERPL-CCNC: 33 U/L (ref 15–37)
BASOPHILS NFR BLD AUTO: 2.6 % (ref 0–2)
BILIRUB SERPL-MCNC: 0.7 MG/DL (ref 0.2–1)
BUN SERPL-MCNC: 11 MG/DL (ref 7–18)
CALCIUM SERPL-MCNC: 7.5 MG/DL (ref 8.5–10.1)
CHLORIDE SERPL-SCNC: 106 MMOL/L (ref 98–107)
CO2 SERPL-SCNC: 22 MMOL/L (ref 21–32)
CREAT SERPL-MCNC: 0.6 MG/DL (ref 0.55–1.3)
EOSINOPHIL NFR BLD AUTO: 1.9 % (ref 0–3)
ERYTHROCYTE [DISTWIDTH] IN BLOOD BY AUTOMATED COUNT: 12.7 % (ref 11.6–14.8)
GLOBULIN SER-MCNC: 4.2 G/DL
HCT VFR BLD CALC: 30.4 % (ref 42–52)
HGB BLD-MCNC: 9.9 G/DL (ref 14.2–18)
LYMPHOCYTES NFR BLD AUTO: 7 % (ref 20–45)
MCV RBC AUTO: 94 FL (ref 80–99)
MONOCYTES NFR BLD AUTO: 10.4 % (ref 1–10)
NEUTROPHILS NFR BLD AUTO: 78.2 % (ref 45–75)
PLATELET # BLD: 543 K/UL (ref 150–450)
POTASSIUM SERPL-SCNC: 3.3 MMOL/L (ref 3.5–5.1)
RBC # BLD AUTO: 3.23 M/UL (ref 4.7–6.1)
SODIUM SERPL-SCNC: 138 MMOL/L (ref 136–145)
WBC # BLD AUTO: 7.4 K/UL (ref 4.8–10.8)

## 2020-07-09 RX ADMIN — INSULIN ASPART SCH UNITS: 100 INJECTION, SOLUTION INTRAVENOUS; SUBCUTANEOUS at 11:30

## 2020-07-09 RX ADMIN — PANTOPRAZOLE SODIUM SCH MG: 40 INJECTION, POWDER, FOR SOLUTION INTRAVENOUS at 09:36

## 2020-07-09 RX ADMIN — SODIUM CHLORIDE SCH MLS/HR: 0.9 INJECTION INTRAVENOUS at 20:01

## 2020-07-09 RX ADMIN — DEXTROSE MONOHYDRATE SCH MLS/HR: 50 INJECTION, SOLUTION INTRAVENOUS at 05:00

## 2020-07-09 RX ADMIN — SODIUM CHLORIDE SCH MLS/HR: 9 INJECTION, SOLUTION INTRAVENOUS at 17:22

## 2020-07-09 RX ADMIN — DEXTROSE MONOHYDRATE SCH MLS/HR: 50 INJECTION, SOLUTION INTRAVENOUS at 14:00

## 2020-07-09 RX ADMIN — INSULIN ASPART SCH UNITS: 100 INJECTION, SOLUTION INTRAVENOUS; SUBCUTANEOUS at 16:30

## 2020-07-09 RX ADMIN — HUMAN INSULIN SCH MLS/HR: 100 INJECTION, SOLUTION SUBCUTANEOUS at 05:00

## 2020-07-09 RX ADMIN — SODIUM CHLORIDE SCH MLS/HR: 9 INJECTION, SOLUTION INTRAVENOUS at 05:00

## 2020-07-09 RX ADMIN — INSULIN ASPART SCH UNITS: 100 INJECTION, SOLUTION INTRAVENOUS; SUBCUTANEOUS at 20:00

## 2020-07-09 RX ADMIN — INSULIN ASPART SCH UNITS: 100 INJECTION, SOLUTION INTRAVENOUS; SUBCUTANEOUS at 05:30

## 2020-07-09 RX ADMIN — DEXTROSE MONOHYDRATE SCH MLS/HR: 50 INJECTION, SOLUTION INTRAVENOUS at 21:19

## 2020-07-09 NOTE — PULMONOLOGY PROGRESS NOTE
Subjective


ROS Limited/Unobtainable:  Yes


Allergies:  


Coded Allergies:  


     No Known Allergies (Unverified , 7/6/20)


All Systems:  reviewed and negative except above


Subjective


overnight events reviewed


care noted


on oxygen


no distress





Objective





Last 24 Hour Vital Signs








  Date Time  Temp Pulse Resp B/P (MAP) Pulse Ox O2 Delivery O2 Flow Rate FiO2


 


7/9/20 08:00      Nasal Cannula 2.0 


 


7/9/20 08:00 97.9 95 21 128/75 (92) 99   


 


7/9/20 08:00  91      


 


7/9/20 04:00 97.9 93 19 132/76 (94) 99   


 


7/9/20 04:00      Nasal Cannula 2.0 


 


7/9/20 03:27  99      


 


7/9/20 00:00 97.3 103 20 138/74 (95) 99   


 


7/9/20 00:00      Nasal Cannula 2.0 


 


7/8/20 23:35  99      


 


7/8/20 20:00  110      


 


7/8/20 20:00      Nasal Cannula 2.0 


 


7/8/20 20:00      Nasal Cannula 2.0 


 


7/8/20 20:00 97.4 110 20 150/89 (109) 99   


 


7/8/20 16:00 96.9 111 20 140/75 (96) 98   


 


7/8/20 16:00      Nasal Cannula 2.0 


 


7/8/20 16:00  110      


 


7/8/20 12:00 96.4 110 20 148/67 (94) 99   


 


7/8/20 12:00      Nasal Cannula 2.0 


 


7/8/20 11:33  108      

















Intake and Output  


 


 7/8/20 7/9/20





 19:00 07:00


 


Intake Total 1450 ml 1692.416 ml


 


Output Total 700 ml 1975 ml


 


Balance 750 ml -282.584 ml


 


  


 


IV Total 1200 ml 1692.416 ml


 


Blood Product 250 ml 


 


Output Urine Total 700 ml 1975 ml








Objective


deferred due to COVID+








Microbiology








 Date/Time


Source Procedure


Growth Status


 


 


 7/6/20 16:00


Nasopharynx Coronavirus COVID-19 PCR ([facility]) - Final Complete








Laboratory Tests


7/8/20 12:14: POC Whole Blood Glucose 142H


7/8/20 17:44: POC Whole Blood Glucose 161H


7/9/20 03:40: 


White Blood Count 7.4, Red Blood Count 3.23L, Hemoglobin 9.9#L, Hematocrit 30.4#

L, Mean Corpuscular Volume 94, Mean Corpuscular Hemoglobin 30.8, Mean 

Corpuscular Hemoglobin Concent 32.7, Red Cell Distribution Width 12.7, Platelet 

Count 543H, Mean Platelet Volume 5.1L, Neutrophils (%) (Auto) 78.2H, 

Lymphocytes (%) (Auto) 7.0L, Monocytes (%) (Auto) 10.4H, Eosinophils (%) (Auto) 

1.9, Basophils (%) (Auto) 2.6H, Sodium Level 138, Potassium Level 3.3L, 

Chloride Level 106, Carbon Dioxide Level 22, Anion Gap 10, Blood Urea Nitrogen 

11, Creatinine 0.6, Estimat Glomerular Filtration Rate > 60, Glucose Level 142H

, Calcium Level 7.5L, Total Bilirubin 0.7, Aspartate Amino Transf (AST/SGOT) 33

, Alanine Aminotransferase (ALT/SGPT) 33, Alkaline Phosphatase 166H, Pro-B-Type 

Natriuretic Peptide 1580H, Total Protein 5.8L, Albumin 1.6L, Globulin 4.2, 

Albumin/Globulin Ratio 0.4L


7/9/20 04:49: POC Whole Blood Glucose [Pending]





Current Medications








 Medications


  (Trade)  Dose


 Ordered  Sig/Aron


 Route


 PRN Reason  Start Time


 Stop Time Status Last Admin


Dose Admin


 


 Dextrose


  (Dextrose 50%)  25 ml  Q30M  PRN


 IV


 Hypoglycemia  7/7/20 09:45


 10/5/20 09:44   


 


 


 Dextrose


  (Dextrose 50%)  50 ml  Q30M  PRN


 IV


 Hypoglycemia  7/7/20 09:45


 10/5/20 09:44   


 


 


 Dextrose/Sodium


 Chloride  1,000 ml @ 


 100 mls/hr  Q10H


 IV


   7/6/20 07:30


 8/5/20 07:29  7/9/20 05:00


 


 


 Insulin Aspart


  (NovoLOG)    BEFORE MEALS AND  HS


 SUBQ


   7/7/20 11:30


 10/5/20 11:29  7/8/20 17:51


 


 


 Iron Sucrose 100


 mg/Sodium Chloride  60 ml @ 


 240 mls/hr  BEDTIME


 IVPB


   7/8/20 21:00


 7/12/20 21:14  7/8/20 20:21


 


 


 Pantoprazole


  (Protonix)  40 mg  DAILY


 IVP


   7/6/20 09:00


 8/5/20 08:59  7/8/20 10:06


 


 


 Piperacillin Sod/


 Tazobactam Sod


 3.375 gm/Sodium


 Chloride  110 ml @ 


 27.5 mls/hr  EVERY 8  HOURS


 IVPB


   7/6/20 10:30


 7/13/20 10:29  7/9/20 05:00


 


 


 Vancomycin HCl


  ([facility]


 to dose)  1 ea  DAILY  PRN


 MISC


 Per rx protocol  7/6/20 07:15


 8/5/20 07:14   


 


 


 Vancomycin HCl 1


 gm/Sodium Chloride  275 ml @ 


 183.708


 mls/hr  Q12HR@0600,1800


 IVPB


   7/8/20 06:00


 7/13/20 05:59  7/9/20 05:00


 











Assessment/Plan


Assessment/Plan





IMPRESSION:


1. Respiratory failure.


2. Acute hypoxemia.


3. Significant pneumonia, possible COVID.


4. Severe protein-calorie malnutrition.


5. Elevated alkaline phosphatase, 


6. Significant anemia likely of chronic disease.


7. BPH.





PLAN


care noted


imaging reviewed


on oxygen 


respiratory care 


on isolation 


DVT prophylaxis


as is


will follow up for worsening congestion 


impression, plan, and exam edited and reviewed in detail


care discussed with Cesar Winn MD Jul 9, 2020 08:40

## 2020-07-09 NOTE — INFECTIOUS DISEASES PROG NOTE
Assessment/Plan


Assessment/Plan


A


1. pneumonia


   COVID 19 rapid test negative x 2


2. diabetes mellitus


3. hypertension


4. Anemia


5. Hypoxic respiratory failure





P


1. continue iv vancomycin, Zosyn


2.  will follow up cultures





Subjective


ROS Limited/Unobtainable:  Yes


Constitutional:  Denies: fever


Allergies:  


Coded Allergies:  


     No Known Allergies (Unverified , 7/6/20)





Objective





Last 24 Hour Vital Signs








  Date Time  Temp Pulse Resp B/P (MAP) Pulse Ox O2 Delivery O2 Flow Rate FiO2


 


7/9/20 08:00      Nasal Cannula 2.0 


 


7/9/20 08:00 97.9 95 21 128/75 (92) 99   


 


7/9/20 08:00  91      


 


7/9/20 07:10     99 Nasal Cannula 2.0 28


 


7/9/20 04:00 97.9 93 19 132/76 (94) 99   


 


7/9/20 04:00      Nasal Cannula 2.0 


 


7/9/20 03:27  99      


 


7/9/20 00:00 97.3 103 20 138/74 (95) 99   


 


7/9/20 00:00      Nasal Cannula 2.0 


 


7/8/20 23:35  99      


 


7/8/20 20:00  110      


 


7/8/20 20:00      Nasal Cannula 2.0 


 


7/8/20 20:00      Nasal Cannula 2.0 


 


7/8/20 20:00 97.4 110 20 150/89 (109) 99   


 


7/8/20 16:00 96.9 111 20 140/75 (96) 98   


 


7/8/20 16:00      Nasal Cannula 2.0 


 


7/8/20 16:00  110      


 


7/8/20 12:00 96.4 110 20 148/67 (94) 99   


 


7/8/20 12:00      Nasal Cannula 2.0 


 


7/8/20 11:33  108      








Height (Feet):  6


Weight (Pounds):  179


General Appearance:  no acute distress


HEENT:  mucous membranes moist


Respiratory/Chest:  lungs clear, other - oxygen by nasal cannula


Cardiovascular:  normal rate, other - left arm PICC line


Abdomen:  soft, non tender, other - NG tube


Genitourinary:  other


Extremities:  no edema


Neurologic/Psychiatric:  alert, responsive


Musculoskeletal:  atrophy





Microbiology








 Date/Time


Source Procedure


Growth Status


 


 


 7/6/20 16:00


Nasopharynx Coronavirus COVID-19 PCR ([facility]) - Final Complete











Laboratory Tests








Test


  7/8/20


12:14 7/8/20


17:44 7/8/20


20:18 7/9/20


03:40


 


POC Whole Blood Glucose


  142 MG/DL


()  H 161 MG/DL


()  H Pending  


  


 


 


White Blood Count


  


  


  


  7.4 K/UL


(4.8-10.8)


 


Red Blood Count


  


  


  


  3.23 M/UL


(4.70-6.10)  L


 


Hemoglobin


  


  


  


  9.9 G/DL


(14.2-18.0)  #L


 


Hematocrit


  


  


  


  30.4 %


(42.0-52.0)  #L


 


Mean Corpuscular Volume    94 FL (80-99)  


 


Mean Corpuscular Hemoglobin


  


  


  


  30.8 PG


(27.0-31.0)


 


Mean Corpuscular Hemoglobin


Concent 


  


  


  32.7 G/DL


(32.0-36.0)


 


Red Cell Distribution Width


  


  


  


  12.7 %


(11.6-14.8)


 


Platelet Count


  


  


  


  543 K/UL


(150-450)  H


 


Mean Platelet Volume


  


  


  


  5.1 FL


(6.5-10.1)  L


 


Neutrophils (%) (Auto)


  


  


  


  78.2 %


(45.0-75.0)  H


 


Lymphocytes (%) (Auto)


  


  


  


  7.0 %


(20.0-45.0)  L


 


Monocytes (%) (Auto)


  


  


  


  10.4 %


(1.0-10.0)  H


 


Eosinophils (%) (Auto)


  


  


  


  1.9 %


(0.0-3.0)


 


Basophils (%) (Auto)


  


  


  


  2.6 %


(0.0-2.0)  H


 


Sodium Level


  


  


  


  138 MMOL/L


(136-145)


 


Potassium Level


  


  


  


  3.3 MMOL/L


(3.5-5.1)  L


 


Chloride Level


  


  


  


  106 MMOL/L


()


 


Carbon Dioxide Level


  


  


  


  22 MMOL/L


(21-32)


 


Anion Gap


  


  


  


  10 mmol/L


(5-15)


 


Blood Urea Nitrogen


  


  


  


  11 mg/dL


(7-18)


 


Creatinine


  


  


  


  0.6 MG/DL


(0.55-1.30)


 


Estimat Glomerular Filtration


Rate 


  


  


  > 60 mL/min


(>60)


 


Glucose Level


  


  


  


  142 MG/DL


()  H


 


Calcium Level


  


  


  


  7.5 MG/DL


(8.5-10.1)  L


 


Total Bilirubin


  


  


  


  0.7 MG/DL


(0.2-1.0)


 


Aspartate Amino Transf


(AST/SGOT) 


  


  


  33 U/L (15-37)


 


 


Alanine Aminotransferase


(ALT/SGPT) 


  


  


  33 U/L (12-78)


 


 


Alkaline Phosphatase


  


  


  


  166 U/L


()  H


 


Pro-B-Type Natriuretic Peptide


  


  


  


  1580 pg/mL


(0-125)  H


 


Total Protein


  


  


  


  5.8 G/DL


(6.4-8.2)  L


 


Albumin


  


  


  


  1.6 G/DL


(3.4-5.0)  L


 


Globulin    4.2 g/dL  


 


Albumin/Globulin Ratio


  


  


  


  0.4 (1.0-2.7)


L


 


Test


  7/9/20


04:49 7/9/20


08:26 


  


 


 


POC Whole Blood Glucose Pending     


 


Arterial Blood pH


  


  7.476


(7.350-7.450) 


  


 


 


Arterial Blood Partial


Pressure CO2 


  28.4 mmHg


(35.0-45.0)  L 


  


 


 


Arterial Blood Partial


Pressure O2 


  72.7 mmHg


(75.0-100.0)  L 


  


 


 


Arterial Blood HCO3


  


  20.5 mmol/L


(22.0-26.0)  L 


  


 


 


Arterial Blood Oxygen


Saturation 


  94.4 %


()  L 


  


 


 


Arterial Blood Base Excess  -2.3 (-2-2)  L  


 


Alexandr Test  Positive    











Current Medications








 Medications


  (Trade)  Dose


 Ordered  Sig/Aron


 Route


 PRN Reason  Start Time


 Stop Time Status Last Admin


Dose Admin


 


 Dextrose


  (Dextrose 50%)  25 ml  Q30M  PRN


 IV


 Hypoglycemia  7/7/20 09:45


 10/5/20 09:44   


 


 


 Dextrose


  (Dextrose 50%)  50 ml  Q30M  PRN


 IV


 Hypoglycemia  7/7/20 09:45


 10/5/20 09:44   


 


 


 Dextrose/Sodium


 Chloride  1,000 ml @ 


 100 mls/hr  Q10H


 IV


   7/6/20 07:30


 8/5/20 07:29  7/9/20 05:00


 


 


 Insulin Aspart


  ([facility]ovo[facility])    BEFORE MEALS AND  HS


 SUBQ


   7/7/20 11:30


 10/5/20 11:29  7/8/20 17:51


 


 


 Iron Sucrose 100


 mg/Sodium Chloride  60 ml @ 


 240 mls/hr  BEDTIME


 IVPB


   7/8/20 21:00


 7/12/20 21:14  7/8/20 20:21


 


 


 Pantoprazole


  (Protonix)  40 mg  DAILY


 IVP


   7/6/20 09:00


 8/5/20 08:59  7/8/20 10:06


 


 


 Piperacillin Sod/


 Tazobactam Sod


 3.375 gm/Sodium


 Chloride  110 ml @ 


 27.5 mls/hr  EVERY 8  HOURS


 IVPB


   7/6/20 10:30


 7/13/20 10:29  7/9/20 05:00


 


 


 Potassium Chloride  100 ml @ 


 100 mls/hr  Q1HR


 IVPB


   7/9/20 10:00


 7/9/20 12:59   


 


 


 Vancomycin HCl


  ([facility]


 to dose)  1 ea  DAILY  PRN


 MISC


 Per rx protocol  7/6/20 07:15


 8/5/20 07:14   


 


 


 Vancomycin HCl 1


 gm/Sodium Chloride  275 ml @ 


 183.708


 mls/hr  Q12HR@0600,1800


 IVPB


   7/8/20 06:00


 7/13/20 05:59  7/9/20 05:00


 

















Keith Morris MD Jul 9, 2020 09:37

## 2020-07-09 NOTE — PROGRESS NOTE
DATE:  07/08/2020

CARDIOLOGY PROGRESS NOTE



SUBJECTIVE:  The patient has improved oxygenation.  Blood pressure

parameters remains stable range.  Sinus tachycardia with paroxysmal of

atrial fibrillation noted.



OBJECTIVE:

VITAL SIGNS:  Blood pressure 123/67, pulse 109, respirations 22, and

afebrile.

LUNGS:  Bilateral breath sounds with rhonchi and rales.

CARDIAC:  Irregularly irregular rhythm.  Normal S1, S2.  A 1/6 systolic

murmur at apex.

ABDOMEN:  Softly distended.

EXTREMITIES:  With trace edema.



LABORATORY AND DIAGNOSTIC DATA:  X-ray of the abdomen today reveals

decreased bowel gas pattern.  White count 6.9, hemoglobin 7.3.  BUN 15,

creatinine 0.7, potassium 2.8.  Albumin 1.5.



IMPRESSION:

1. Healthcare-associated pneumonia.

2. Paralytic ileus, improved.

3. Hypertensive heart disease.

4. Sepsis.

5. Hypoxia, improved.

6. Hypovolemia and dehydration, corrected.

7. Tachyarrhythmias.

8. Hypokalemia.

9. Worsening anemia with no signs of active bleeding.



PLAN:

1. Antimicrobials.

2. Oxygen as needed.

3. Adjust IV fluids.

4. Packed red blood cell transfusion.

5. Replace potassium.

6. Recheck electrolytes including magnesium.

7. Surgical clearance for nutrition.









  ______________________________________________

  Theron Mckenzie M.D.





DR:  BENJAMIN

D:  07/09/2020 00:52

T:  07/09/2020 02:33

JOB#:  [id number]

CC:

## 2020-07-09 NOTE — SURGERY PROGRESS NOTE
Surgery Progress Note


Subjective


Additional Comments


KUB improved.  Exam improved.  No acute events.  Labs reviewed.  Doing well 

overall slowly improving.





Objective





Last 24 Hour Vital Signs








  Date Time  Temp Pulse Resp B/P (MAP) Pulse Ox O2 Delivery O2 Flow Rate FiO2


 


7/9/20 12:00      Nasal Cannula 2.0 


 


7/9/20 12:00  89      


 


7/9/20 11:58 97.9 98 20 134/74 (94) 100   


 


7/9/20 08:00      Nasal Cannula 2.0 


 


7/9/20 08:00 97.9 95 21 128/75 (92) 99   


 


7/9/20 08:00  91      


 


7/9/20 07:10     99 Nasal Cannula 2.0 28


 


7/9/20 04:00 97.9 93 19 132/76 (94) 99   


 


7/9/20 04:00      Nasal Cannula 2.0 


 


7/9/20 03:27  99      


 


7/9/20 00:00 97.3 103 20 138/74 (95) 99   


 


7/9/20 00:00      Nasal Cannula 2.0 


 


7/8/20 23:35  99      


 


7/8/20 20:00  110      


 


7/8/20 20:00      Nasal Cannula 2.0 


 


7/8/20 20:00      Nasal Cannula 2.0 


 


7/8/20 20:00 97.4 110 20 150/89 (109) 99   


 


7/8/20 16:00 96.9 111 20 140/75 (96) 98   


 


7/8/20 16:00      Nasal Cannula 2.0 


 


7/8/20 16:00  110      








I&O











Intake and Output  


 


 7/8/20 7/9/20





 19:00 07:00


 


Intake Total 1450 ml 1692.416 ml


 


Output Total 700 ml 1975 ml


 


Balance 750 ml -282.584 ml


 


  


 


IV Total 1200 ml 1692.416 ml


 


Blood Product 250 ml 


 


Output Urine Total 700 ml 1975 ml








Dressing:  dry


Wound:  clean


Cardiovascular:  RSR


Respiratory:  clear, decreased breath sounds


Abdomen:  soft, non-tender, present bowel sounds


Extremities:  no tenderness, no cyanosis





Laboratory Tests








Test


  7/8/20


17:44 7/8/20


20:18 7/9/20


03:40 7/9/20


04:49


 


POC Whole Blood Glucose


  161 MG/DL


()  H Pending  


  


  Pending  


 


 


White Blood Count


  


  


  7.4 K/UL


(4.8-10.8) 


 


 


Red Blood Count


  


  


  3.23 M/UL


(4.70-6.10)  L 


 


 


Hemoglobin


  


  


  9.9 G/DL


(14.2-18.0)  #L 


 


 


Hematocrit


  


  


  30.4 %


(42.0-52.0)  #L 


 


 


Mean Corpuscular Volume   94 FL (80-99)   


 


Mean Corpuscular Hemoglobin


  


  


  30.8 PG


(27.0-31.0) 


 


 


Mean Corpuscular Hemoglobin


Concent 


  


  32.7 G/DL


(32.0-36.0) 


 


 


Red Cell Distribution Width


  


  


  12.7 %


(11.6-14.8) 


 


 


Platelet Count


  


  


  543 K/UL


(150-450)  H 


 


 


Mean Platelet Volume


  


  


  5.1 FL


(6.5-10.1)  L 


 


 


Neutrophils (%) (Auto)


  


  


  78.2 %


(45.0-75.0)  H 


 


 


Lymphocytes (%) (Auto)


  


  


  7.0 %


(20.0-45.0)  L 


 


 


Monocytes (%) (Auto)


  


  


  10.4 %


(1.0-10.0)  H 


 


 


Eosinophils (%) (Auto)


  


  


  1.9 %


(0.0-3.0) 


 


 


Basophils (%) (Auto)


  


  


  2.6 %


(0.0-2.0)  H 


 


 


Sodium Level


  


  


  138 MMOL/L


(136-145) 


 


 


Potassium Level


  


  


  3.3 MMOL/L


(3.5-5.1)  L 


 


 


Chloride Level


  


  


  106 MMOL/L


() 


 


 


Carbon Dioxide Level


  


  


  22 MMOL/L


(21-32) 


 


 


Anion Gap


  


  


  10 mmol/L


(5-15) 


 


 


Blood Urea Nitrogen


  


  


  11 mg/dL


(7-18) 


 


 


Creatinine


  


  


  0.6 MG/DL


(0.55-1.30) 


 


 


Estimat Glomerular Filtration


Rate 


  


  > 60 mL/min


(>60) 


 


 


Glucose Level


  


  


  142 MG/DL


()  H 


 


 


Calcium Level


  


  


  7.5 MG/DL


(8.5-10.1)  L 


 


 


Total Bilirubin


  


  


  0.7 MG/DL


(0.2-1.0) 


 


 


Aspartate Amino Transf


(AST/SGOT) 


  


  33 U/L (15-37)


  


 


 


Alanine Aminotransferase


(ALT/SGPT) 


  


  33 U/L (12-78)


  


 


 


Alkaline Phosphatase


  


  


  166 U/L


()  H 


 


 


Pro-B-Type Natriuretic Peptide


  


  


  1580 pg/mL


(0-125)  H 


 


 


Total Protein


  


  


  5.8 G/DL


(6.4-8.2)  L 


 


 


Albumin


  


  


  1.6 G/DL


(3.4-5.0)  L 


 


 


Globulin   4.2 g/dL   


 


Albumin/Globulin Ratio


  


  


  0.4 (1.0-2.7)


L 


 


 


Test


  7/9/20


08:26 7/9/20


11:36 


  


 


 


Arterial Blood pH


  7.476


(7.350-7.450) 


  


  


 


 


Arterial Blood Partial


Pressure CO2 28.4 mmHg


(35.0-45.0)  L 


  


  


 


 


Arterial Blood Partial


Pressure O2 72.7 mmHg


(75.0-100.0)  L 


  


  


 


 


Arterial Blood HCO3


  20.5 mmol/L


(22.0-26.0)  L 


  


  


 


 


Arterial Blood Oxygen


Saturation 94.4 %


()  L 


  


  


 


 


Arterial Blood Base Excess -2.3 (-2-2)  L   


 


Alexandr Test Positive     


 


POC Whole Blood Glucose


  


  131 MG/DL


()  H 


  


 











Plan


Problems:  


(1) Anemia


(2) Sepsis


Assessment & Plan:  febrile, tachycardic, abnormal labs, sob, respiratory 

decline


covid neg in ED


recent colonoscopy with perf per report treated conservatively


abd exam limited given medical condition but seemingly benign


non distended, no pain upon exam





CXR noted.  


labs reviewed





KUB pending 


does not seem to be having acute abdominal process but concerning and ddx given 

recent history


no acute surgical intervention planned


will follow with serial exams and recs


thank you 





HILDA noted.. . likely ileus


repeat in AM


hold feeds for now


IV fluids


discussed with team 





HILDA reviewed improved ileus improved


Tube feeds resumed will monitor residuals and tolerance





(3) Acute respiratory failure with hypoxia


(4) HCAP (healthcare-associated pneumonia)


(5) Fever


(6) Shortness of breath


(7) Tachycardia


(8) Suspected COVID-19 virus infection











Anthony Dias Jul 9, 2020 13:58

## 2020-07-09 NOTE — GENERAL PROGRESS NOTE
Assessment/Plan


Problem List:  


(1) Bowel perforation


ICD Codes:  K63.1 - Perforation of intestine (nontraumatic)


SNOMED:  95781051


(2) Anemia


ICD Codes:  D64.9 - Anemia, unspecified


SNOMED:  651392440, 342800808


Qualifiers:  


   Qualified Codes:  D64.9 - Anemia, unspecified


(3) Sepsis


ICD Codes:  A41.9 - Sepsis, unspecified organism


SNOMED:  94073278, 046097486


Qualifiers:  


   Qualified Codes:  A41.9 - Sepsis, unspecified organism; R65.20 - Severe 

sepsis without septic shock; J96.01 - Acute respiratory failure with hypoxia


(4) Tachycardia


ICD Codes:  R00.0 - Tachycardia, unspecified


SNOMED:  3010277


(5) Fever


ICD Codes:  R50.9 - Fever, unspecified


SNOMED:  301348337


(6) HCAP (healthcare-associated pneumonia)


ICD Codes:  J18.9 - Pneumonia, unspecified organism


SNOMED:  213027625, 839122196


(7) Acute respiratory failure with hypoxia


ICD Codes:  J96.01 - Acute respiratory failure with hypoxia


SNOMED:  64851302, 626401607


Status:  stable


Assessment/Plan:


stable/improving


cont current rx


wean o2


resp rx


suctioning as needed


IVF


monitor h/h


check iron panel and stool ob


transfuse as needed


replace k


PPI


feeds when cleared by surgery


No chest compressions. Intubation OK per DTR





Subjective


ROS Limited/Unobtainable:  No


Constitutional:  Reports: malaise, weakness


HEENT:  Reports: no symptoms


Cardiovascular:  Reports: no symptoms


Respiratory:  Reports: cough, SOB at rest, sputum


Gastrointestinal/Abdominal:  Reports: no symptoms


Genitourinary:  Reports: no symptoms


Neurologic/Psychiatric:  Reports: pre-existing deficit


Endocrine:  Reports: no symptoms


Hematologic/Lymphatic:  Reports: anemia


Allergies:  


Coded Allergies:  


     No Known Allergies (Unverified , 7/6/20)


All Systems:  reviewed and negative except above


Subjective


no events. s/p transfusion yesterday. no reports off bleeding. no fever or 

chills. still with ngt. feeds on hold. low k noted.





Objective





Last 24 Hour Vital Signs








  Date Time  Temp Pulse Resp B/P (MAP) Pulse Ox O2 Delivery O2 Flow Rate FiO2


 


7/9/20 08:00      Nasal Cannula 2.0 


 


7/9/20 08:00 97.9 95 21 128/75 (92) 99   


 


7/9/20 08:00  91      


 


7/9/20 04:00 97.9 93 19 132/76 (94) 99   


 


7/9/20 04:00      Nasal Cannula 2.0 


 


7/9/20 03:27  99      


 


7/9/20 00:00 97.3 103 20 138/74 (95) 99   


 


7/9/20 00:00      Nasal Cannula 2.0 


 


7/8/20 23:35  99      


 


7/8/20 20:00  110      


 


7/8/20 20:00      Nasal Cannula 2.0 


 


7/8/20 20:00      Nasal Cannula 2.0 


 


7/8/20 20:00 97.4 110 20 150/89 (109) 99   


 


7/8/20 16:00 96.9 111 20 140/75 (96) 98   


 


7/8/20 16:00      Nasal Cannula 2.0 


 


7/8/20 16:00  110      


 


7/8/20 12:00 96.4 110 20 148/67 (94) 99   


 


7/8/20 12:00      Nasal Cannula 2.0 


 


7/8/20 11:33  108      

















Intake and Output  


 


 7/8/20 7/9/20





 19:00 07:00


 


Intake Total 1450 ml 1692.416 ml


 


Output Total 700 ml 1975 ml


 


Balance 750 ml -282.584 ml


 


  


 


IV Total 1200 ml 1692.416 ml


 


Blood Product 250 ml 


 


Output Urine Total 700 ml 1975 ml








Laboratory Tests


7/8/20 12:14: POC Whole Blood Glucose 142H


7/8/20 17:44: POC Whole Blood Glucose 161H


7/9/20 03:40: 


White Blood Count 7.4, Red Blood Count 3.23L, Hemoglobin 9.9#L, Hematocrit 30.4#

L, Mean Corpuscular Volume 94, Mean Corpuscular Hemoglobin 30.8, Mean 

Corpuscular Hemoglobin Concent 32.7, Red Cell Distribution Width 12.7, Platelet 

Count 543H, Mean Platelet Volume 5.1L, Neutrophils (%) (Auto) 78.2H, 

Lymphocytes (%) (Auto) 7.0L, Monocytes (%) (Auto) 10.4H, Eosinophils (%) (Auto) 

1.9, Basophils (%) (Auto) 2.6H, Sodium Level 138, Potassium Level 3.3L, 

Chloride Level 106, Carbon Dioxide Level 22, Anion Gap 10, Blood Urea Nitrogen 

11, Creatinine 0.6, Estimat Glomerular Filtration Rate > 60, Glucose Level 142H

, Calcium Level 7.5L, Total Bilirubin 0.7, Aspartate Amino Transf (AST/SGOT) 33

, Alanine Aminotransferase (ALT/SGPT) 33, Alkaline Phosphatase 166H, Pro-B-Type 

Natriuretic Peptide 1580H, Total Protein 5.8L, Albumin 1.6L, Globulin 4.2, 

Albumin/Globulin Ratio 0.4L


7/9/20 04:49: POC Whole Blood Glucose [Pending]


7/9/20 08:26: 


Arterial Blood pH 7.476H, Arterial Blood Partial Pressure CO2 28.4L, Arterial 

Blood Partial Pressure O2 72.7L, Arterial Blood HCO3 20.5L, Arterial Blood 

Oxygen Saturation 94.4L, Arterial Blood Base Excess -2.3L, Alexandr Test Positive


Height (Feet):  6


Weight (Pounds):  179


Objective


General Appearance:  WD/WN, lethargic, confused, cachetic


EENT:  PERRL/EOMI, normal ENT inspection


Neck:  non-tender, normal alignment


Cardiovascular:  normal rate, regular rhythm


Respiratory/Chest:  no respiratory distress, no accessory muscle use, rhonchi - 

bilaterally


Abdomen:  normal bowel sounds, non tender, soft, no organomegaly, no mass


Edema:  no edema noted Arm (L), no edema noted Arm (R), no edema noted Leg (L), 

no edema noted Leg (R), no edema noted Pedal (L), no edema noted Pedal (R), no 

edema noted Generalized


Neurologic:  disoriented, aphasia


Skin:  normal pigmentation


Lymphatic:  normal anterior cervical (L), normal anterior cervical (R)











Vel Boudreaux MD Jul 9, 2020 08:55

## 2020-07-10 VITALS — DIASTOLIC BLOOD PRESSURE: 80 MMHG | SYSTOLIC BLOOD PRESSURE: 130 MMHG

## 2020-07-10 VITALS — SYSTOLIC BLOOD PRESSURE: 129 MMHG | DIASTOLIC BLOOD PRESSURE: 75 MMHG

## 2020-07-10 VITALS — SYSTOLIC BLOOD PRESSURE: 143 MMHG | DIASTOLIC BLOOD PRESSURE: 80 MMHG

## 2020-07-10 VITALS — DIASTOLIC BLOOD PRESSURE: 85 MMHG | SYSTOLIC BLOOD PRESSURE: 143 MMHG

## 2020-07-10 VITALS — SYSTOLIC BLOOD PRESSURE: 135 MMHG | DIASTOLIC BLOOD PRESSURE: 74 MMHG

## 2020-07-10 VITALS — DIASTOLIC BLOOD PRESSURE: 88 MMHG | SYSTOLIC BLOOD PRESSURE: 146 MMHG

## 2020-07-10 LAB
ADD MANUAL DIFF: NO
ALBUMIN SERPL-MCNC: 1.9 G/DL (ref 3.4–5)
ALBUMIN/GLOB SERPL: 0.4 {RATIO} (ref 1–2.7)
ALP SERPL-CCNC: 184 U/L (ref 46–116)
ALT SERPL-CCNC: 34 U/L (ref 12–78)
ANION GAP SERPL CALC-SCNC: 11 MMOL/L (ref 5–15)
AST SERPL-CCNC: 36 U/L (ref 15–37)
BASOPHILS NFR BLD AUTO: 0.8 % (ref 0–2)
BILIRUB SERPL-MCNC: 0.7 MG/DL (ref 0.2–1)
BUN SERPL-MCNC: 5 MG/DL (ref 7–18)
CALCIUM SERPL-MCNC: 8 MG/DL (ref 8.5–10.1)
CHLORIDE SERPL-SCNC: 100 MMOL/L (ref 98–107)
CO2 SERPL-SCNC: 22 MMOL/L (ref 21–32)
CREAT SERPL-MCNC: 0.7 MG/DL (ref 0.55–1.3)
EOSINOPHIL NFR BLD AUTO: 2.2 % (ref 0–3)
ERYTHROCYTE [DISTWIDTH] IN BLOOD BY AUTOMATED COUNT: 12.3 % (ref 11.6–14.8)
GLOBULIN SER-MCNC: 4.9 G/DL
HCT VFR BLD CALC: 33.9 % (ref 42–52)
HGB BLD-MCNC: 11.4 G/DL (ref 14.2–18)
LYMPHOCYTES NFR BLD AUTO: 10.8 % (ref 20–45)
MCV RBC AUTO: 93 FL (ref 80–99)
MONOCYTES NFR BLD AUTO: 7.8 % (ref 1–10)
NEUTROPHILS NFR BLD AUTO: 78.3 % (ref 45–75)
PLATELET # BLD: 578 K/UL (ref 150–450)
POTASSIUM SERPL-SCNC: 3.5 MMOL/L (ref 3.5–5.1)
RBC # BLD AUTO: 3.65 M/UL (ref 4.7–6.1)
SODIUM SERPL-SCNC: 133 MMOL/L (ref 136–145)
WBC # BLD AUTO: 9.2 K/UL (ref 4.8–10.8)

## 2020-07-10 RX ADMIN — PANTOPRAZOLE SODIUM SCH MG: 40 INJECTION, POWDER, FOR SOLUTION INTRAVENOUS at 08:17

## 2020-07-10 RX ADMIN — INSULIN ASPART SCH UNITS: 100 INJECTION, SOLUTION INTRAVENOUS; SUBCUTANEOUS at 20:40

## 2020-07-10 RX ADMIN — INSULIN ASPART SCH UNITS: 100 INJECTION, SOLUTION INTRAVENOUS; SUBCUTANEOUS at 11:30

## 2020-07-10 RX ADMIN — DEXTROSE MONOHYDRATE SCH MLS/HR: 50 INJECTION, SOLUTION INTRAVENOUS at 05:31

## 2020-07-10 RX ADMIN — INSULIN ASPART SCH UNITS: 100 INJECTION, SOLUTION INTRAVENOUS; SUBCUTANEOUS at 05:28

## 2020-07-10 RX ADMIN — DEXTROSE MONOHYDRATE SCH MLS/HR: 50 INJECTION, SOLUTION INTRAVENOUS at 22:10

## 2020-07-10 RX ADMIN — DEXTROSE MONOHYDRATE SCH MLS/HR: 50 INJECTION, SOLUTION INTRAVENOUS at 15:29

## 2020-07-10 RX ADMIN — INSULIN ASPART SCH UNITS: 100 INJECTION, SOLUTION INTRAVENOUS; SUBCUTANEOUS at 16:30

## 2020-07-10 RX ADMIN — SODIUM CHLORIDE SCH MLS/HR: 0.9 INJECTION INTRAVENOUS at 20:39

## 2020-07-10 NOTE — DIAGNOSTIC IMAGING REPORT
Indication: Shortness of breath

 

Technique: One view of the chest

 

Comparison: 7/7/2020

 

Findings: Bilateral infiltrates are unchanged. There is probably a trace pleural

fluid on the left. This may be new or increased since prior exam. Nasogastric feeding

tube is again demonstrated. The heart size is normal

 

Impression: New or increased small left pleural effusion. Otherwise little change

over 3 days

## 2020-07-10 NOTE — GENERAL PROGRESS NOTE
Assessment/Plan


Problem List:  


(1) Bowel perforation


ICD Codes:  K63.1 - Perforation of intestine (nontraumatic)


SNOMED:  92020354


(2) Anemia


ICD Codes:  D64.9 - Anemia, unspecified


SNOMED:  237707333, 049860348


Qualifiers:  


   Qualified Codes:  D64.9 - Anemia, unspecified


(3) Sepsis


ICD Codes:  A41.9 - Sepsis, unspecified organism


SNOMED:  51793529, 101096598


Qualifiers:  


   Qualified Codes:  A41.9 - Sepsis, unspecified organism; R65.20 - Severe 

sepsis without septic shock; J96.01 - Acute respiratory failure with hypoxia


(4) Tachycardia


ICD Codes:  R00.0 - Tachycardia, unspecified


SNOMED:  6721619


(5) Fever


ICD Codes:  R50.9 - Fever, unspecified


SNOMED:  632623197


(6) HCAP (healthcare-associated pneumonia)


ICD Codes:  J18.9 - Pneumonia, unspecified organism


SNOMED:  800181613, 885466376


(7) Acute respiratory failure with hypoxia


ICD Codes:  J96.01 - Acute respiratory failure with hypoxia


SNOMED:  17818377, 575429651


Status:  stable


Assessment/Plan:


stable/improving


cont current rx


wean o2


resp rx


suctioning as needed


IVF


monitor h/h


check iron panel and stool ob


transfuse as needed


replace k


PPI


feeds when cleared by surgery


No chest compressions. Intubation OK per DTR





Subjective


ROS Limited/Unobtainable:  Yes


Constitutional:  Reports: malaise, weakness


HEENT:  Reports: no symptoms


Cardiovascular:  Reports: no symptoms


Respiratory:  Reports: no symptoms


Gastrointestinal/Abdominal:  Reports: no symptoms


Genitourinary:  Reports: no symptoms


Neurologic/Psychiatric:  Reports: pre-existing deficit


Endocrine:  Reports: no symptoms


Hematologic/Lymphatic:  Reports: anemia


Allergies:  


Coded Allergies:  


     No Known Allergies (Unverified , 7/6/20)


All Systems:  reviewed and negative except above


Subjective


no events. s/p transfusion yesterday. no reports off bleeding. no fever or 

chills. still with ngt. feeds on hold. Surgery noted.  Okay to start feedings.  

Labs reviewed.





Objective





Last 24 Hour Vital Signs








  Date Time  Temp Pulse Resp B/P (MAP) Pulse Ox O2 Delivery O2 Flow Rate FiO2


 


7/10/20 12:00  99      


 


7/10/20 12:00 97.9 99 22 135/74 (94) 99   


 


7/10/20 09:00      Nasal Cannula 2.0 


 


7/10/20 08:00  102      


 


7/10/20 08:00 97.9 102 20 129/75 (93) 100   


 


7/10/20 05:32     100 Nasal Cannula 2.0 28


 


7/10/20 04:00  113      


 


7/10/20 04:00 99.0 112 20 143/80 (101) 99   


 


7/10/20 00:00 97.5 110 20 146/88 (107) 100   


 


7/10/20 00:00      Nasal Cannula 2.0 


 


7/9/20 23:31  108      


 


7/9/20 20:00      Nasal Cannula 2.0 


 


7/9/20 20:00  96      


 


7/9/20 20:00      Nasal Cannula 2.0 


 


7/9/20 19:58 97.3 105 21 145/86 (105) 100   

















Intake and Output  


 


 7/9/20 7/10/20





 19:00 07:00


 


Intake Total 75 ml 695.0 ml


 


Output Total 1500 ml 1600 ml


 


Balance -1425 ml -905.0 ml


 


  


 


IV Total 75 ml 695.0 ml


 


Output Urine Total 1500 ml 1600 ml


 


# Bowel Movements  1








Laboratory Tests


7/9/20 16:55: Vancomycin Level Trough 16.1H


7/9/20 17:09: POC Whole Blood Glucose 124H


7/9/20 22:00: Stool Occult Blood Positive


7/10/20 04:37: 


White Blood Count 9.2, Red Blood Count 3.65L, Hemoglobin 11.4L, Hematocrit 33.9L

, Mean Corpuscular Volume 93, Mean Corpuscular Hemoglobin 31.4H, Mean 

Corpuscular Hemoglobin Concent 33.7, Red Cell Distribution Width 12.3, Platelet 

Count 578H, Mean Platelet Volume 4.9L, Neutrophils (%) (Auto) 78.3H, 

Lymphocytes (%) (Auto) 10.8L, Monocytes (%) (Auto) 7.8, Eosinophils (%) (Auto) 

2.2, Basophils (%) (Auto) 0.8, Sodium Level 133L, Potassium Level 3.5, Chloride 

Level 100, Carbon Dioxide Level 22, Anion Gap 11, Blood Urea Nitrogen 5L, 

Creatinine 0.7, Estimat Glomerular Filtration Rate > 60, Glucose Level 111H, 

Calcium Level 8.0L, Magnesium Level 1.6L, Total Bilirubin 0.7, Aspartate Amino 

Transf (AST/SGOT) 36, Alanine Aminotransferase (ALT/SGPT) 34, Alkaline 

Phosphatase 184H, Pro-B-Type Natriuretic Peptide 1761H, Total Protein 6.8, 

Albumin 1.9L, Globulin 4.9, Albumin/Globulin Ratio 0.4L


7/10/20 12:04: POC Whole Blood Glucose 117H


7/10/20 15:55: POC Whole Blood Glucose 123H


Height (Feet):  6


Weight (Pounds):  179


Objective


General Appearance:  WD/WN, lethargic, confused, cachetic


EENT:  PERRL/EOMI, normal ENT inspection


Neck:  non-tender, normal alignment


Cardiovascular:  normal rate, regular rhythm


Respiratory/Chest:  no respiratory distress, no accessory muscle use, rhonchi - 

bilaterally


Abdomen:  normal bowel sounds, non tender, soft, no organomegaly, no mass


Edema:  no edema noted Arm (L), no edema noted Arm (R), no edema noted Leg (L), 

no edema noted Leg (R), no edema noted Pedal (L), no edema noted Pedal (R), no 

edema noted Generalized


Neurologic:  disoriented, aphasia


Skin:  normal pigmentation


Lymphatic:  normal anterior cervical (L), normal anterior cervical (R)











Vel Boudreaux MD Jul 10, 2020 16:06

## 2020-07-10 NOTE — SURGERY PROGRESS NOTE
Surgery Progress Note


Subjective


Additional Comments


labs joseph moraes improved


start tube feeds


+BM





Objective





Last 24 Hour Vital Signs








  Date Time  Temp Pulse Resp B/P (MAP) Pulse Ox O2 Delivery O2 Flow Rate FiO2


 


7/10/20 05:32     100 Nasal Cannula 2.0 28


 


7/10/20 04:00  113      


 


7/10/20 04:00 99.0 112 20 143/80 (101) 99   


 


7/10/20 00:00 97.5 110 20 146/88 (107) 100   


 


7/10/20 00:00      Nasal Cannula 2.0 


 


7/9/20 23:31  108      


 


7/9/20 20:00      Nasal Cannula 2.0 


 


7/9/20 20:00  96      


 


7/9/20 20:00      Nasal Cannula 2.0 


 


7/9/20 19:58 97.3 105 21 145/86 (105) 100   


 


7/9/20 16:00      Nasal Cannula 2.0 


 


7/9/20 16:00  90      


 


7/9/20 16:00 97.9 97 21 139/82 (101) 99   


 


7/9/20 12:00      Nasal Cannula 2.0 


 


7/9/20 12:00  89      


 


7/9/20 11:58 97.9 98 20 134/74 (94) 100   








I&O











Intake and Output  


 


 7/9/20 7/10/20





 19:00 07:00


 


Intake Total 75 ml 620.0 ml


 


Output Total 1500 ml 1600 ml


 


Balance -1425 ml -980.0 ml


 


  


 


IV Total 75 ml 620.0 ml


 


Output Urine Total 1500 ml 1600 ml


 


# Bowel Movements  1








Dressing:  other


Wound:  other


Drains:  other


Cardiovascular:  RSR


Respiratory:  decreased breath sounds


Abdomen:  soft, non-tender, present bowel sounds, non-distended


Extremities:  no edema, no tenderness, no cyanosis





Laboratory Tests








Test


  7/9/20


11:36 7/9/20


16:55 7/9/20


17:09 7/9/20


22:00


 


POC Whole Blood Glucose


  131 MG/DL


()  H 


  124 MG/DL


()  H 


 


 


Vancomycin Level Trough


  


  16.1 ug/mL


(5.0-12.0)  H 


  


 


 


Stool Occult Blood    Pending  


 


Test


  7/10/20


04:37 


  


  


 


 


White Blood Count


  9.2 K/UL


(4.8-10.8) 


  


  


 


 


Red Blood Count


  3.65 M/UL


(4.70-6.10)  L 


  


  


 


 


Hemoglobin


  11.4 G/DL


(14.2-18.0)  L 


  


  


 


 


Hematocrit


  33.9 %


(42.0-52.0)  L 


  


  


 


 


Mean Corpuscular Volume 93 FL (80-99)     


 


Mean Corpuscular Hemoglobin


  31.4 PG


(27.0-31.0)  H 


  


  


 


 


Mean Corpuscular Hemoglobin


Concent 33.7 G/DL


(32.0-36.0) 


  


  


 


 


Red Cell Distribution Width


  12.3 %


(11.6-14.8) 


  


  


 


 


Platelet Count


  578 K/UL


(150-450)  H 


  


  


 


 


Mean Platelet Volume


  4.9 FL


(6.5-10.1)  L 


  


  


 


 


Neutrophils (%) (Auto)


  78.3 %


(45.0-75.0)  H 


  


  


 


 


Lymphocytes (%) (Auto)


  10.8 %


(20.0-45.0)  L 


  


  


 


 


Monocytes (%) (Auto)


  7.8 %


(1.0-10.0) 


  


  


 


 


Eosinophils (%) (Auto)


  2.2 %


(0.0-3.0) 


  


  


 


 


Basophils (%) (Auto)


  0.8 %


(0.0-2.0) 


  


  


 


 


Sodium Level


  133 MMOL/L


(136-145)  L 


  


  


 


 


Potassium Level


  3.5 MMOL/L


(3.5-5.1) 


  


  


 


 


Chloride Level


  100 MMOL/L


() 


  


  


 


 


Carbon Dioxide Level


  22 MMOL/L


(21-32) 


  


  


 


 


Anion Gap


  11 mmol/L


(5-15) 


  


  


 


 


Blood Urea Nitrogen


  5 mg/dL (7-18)


L 


  


  


 


 


Creatinine


  0.7 MG/DL


(0.55-1.30) 


  


  


 


 


Estimat Glomerular Filtration


Rate > 60 mL/min


(>60) 


  


  


 


 


Glucose Level


  111 MG/DL


()  H 


  


  


 


 


Calcium Level


  8.0 MG/DL


(8.5-10.1)  L 


  


  


 


 


Magnesium Level


  1.6 MG/DL


(1.8-2.4)  L 


  


  


 


 


Total Bilirubin


  0.7 MG/DL


(0.2-1.0) 


  


  


 


 


Aspartate Amino Transf


(AST/SGOT) 36 U/L (15-37)


  


  


  


 


 


Alanine Aminotransferase


(ALT/SGPT) 34 U/L (12-78)


  


  


  


 


 


Alkaline Phosphatase


  184 U/L


()  H 


  


  


 


 


Pro-B-Type Natriuretic Peptide


  1761 pg/mL


(0-125)  H 


  


  


 


 


Total Protein


  6.8 G/DL


(6.4-8.2) 


  


  


 


 


Albumin


  1.9 G/DL


(3.4-5.0)  L 


  


  


 


 


Globulin 4.9 g/dL     


 


Albumin/Globulin Ratio


  0.4 (1.0-2.7)


L 


  


  


 











Plan


Problems:  


(1) Anemia


(2) Sepsis


Assessment & Plan:  febrile, tachycardic, abnormal labs, sob, respiratory 

decline


covid neg in ED


recent colonoscopy with perf per report treated conservatively


abd exam limited given medical condition but seemingly benign


non distended, no pain upon exam





CXR noted.  


labs reviewed





KUB pending 


does not seem to be having acute abdominal process but concerning and ddx given 

recent history


no acute surgical intervention planned


will follow with serial exams and recs


thank you 





KUFORD noted.. . likely ileus


repeat in AM


hold feeds for now


IV fluids


discussed with team 





KUFORD reviewed improved ileus improved


Tube feeds resumed will monitor residuals and tolerance





+BM


tube feeds as tolerated 





(3) Acute respiratory failure with hypoxia


(4) HCAP (healthcare-associated pneumonia)


(5) Fever


(6) Shortness of breath


(7) Tachycardia


(8) Suspected COVID-19 virus infection











Anthony Dias Jul 10, 2020 08:49

## 2020-07-10 NOTE — PROGRESS NOTE
DATE:  07/09/2020

CARDIOLOGY PROGRESS NOTE



SUBJECTIVE:  Patient remains NPO on G-tube decompression.  He is status

post packed red blood cell transfusion yesterday.  He continues to require

respiratory hygiene and suctioning.  Monitored rhythm, atrial fibrillation

with better ventricular rate and occasional ventricular ectopics.



PHYSICAL EXAMINATION:

VITAL SIGNS:  Blood pressure 128/75, heart rate 95, respirations 21,

afebrile.

LUNGS:  Coarse breath sounds.  Rhonchi.

CARDIAC:  Irregularly irregular rhythm.  Normal S1, S2.

ABDOMEN:  Soft and less distended.

EXTREMITIES:  No edema.



LABORATORY DATA:  White count 7.4, hemoglobin 9.9.  Glucose 142, sodium

138, potassium 3.3, bicarb 22, BUN 11, creatinine 0.6.  Albumin 1.6.  Pro

natriuretic peptide 1580.  ABG 7.47, 28, 94% saturation of oxygen.



IMPRESSION:

1. Healthcare-acquired pneumonia.

2. Paralytic ileus, improving.

3. Hypertensive heart disease, stable.

4. Sepsis with recovered shock.

5. Hypoxia, improving.

6. Corrected hypovolemia and dehydration.

7. Atrial fibrillation paroxysmal.

8. Anemia, status post transfusion.

9. Hypokalemia.

10. Recent bowel perforation.



PLAN:

1. Hold feedings per surgeon.

2. Continue IV fluids.

3. Replace potassium.

4. Recheck magnesium.

5. DVT prophylaxis.

6. Transfuse for hemoglobin less than 8 g.

7. May need TPN if unable to start feeding soon.









  ______________________________________________

  Theron Mckenzie M.D.





DR:  MIKIE

D:  07/09/2020 23:13

T:  07/10/2020 04:07

JOB#:  [id number]

CC:

## 2020-07-10 NOTE — PULMONOLOGY PROGRESS NOTE
Subjective


ROS Limited/Unobtainable:  Yes


Constitutional:  Denies: fever


Allergies:  


Coded Allergies:  


     No Known Allergies (Unverified , 7/6/20)


All Systems:  reviewed and negative except above


Subjective


overnight events reviewed


care noted


on oxygen and stable


no distress at present





Objective





Last 24 Hour Vital Signs








  Date Time  Temp Pulse Resp B/P (MAP) Pulse Ox O2 Delivery O2 Flow Rate FiO2


 


7/10/20 05:32     100 Nasal Cannula 2.0 28


 


7/10/20 04:00  113      


 


7/10/20 04:00 99.0 112 20 143/80 (101) 99   


 


7/10/20 00:00 97.5 110 20 146/88 (107) 100   


 


7/10/20 00:00      Nasal Cannula 2.0 


 


7/9/20 23:31  108      


 


7/9/20 20:00      Nasal Cannula 2.0 


 


7/9/20 20:00  96      


 


7/9/20 20:00      Nasal Cannula 2.0 


 


7/9/20 19:58 97.3 105 21 145/86 (105) 100   


 


7/9/20 16:00      Nasal Cannula 2.0 


 


7/9/20 16:00  90      


 


7/9/20 16:00 97.9 97 21 139/82 (101) 99   


 


7/9/20 12:00      Nasal Cannula 2.0 


 


7/9/20 12:00  89      


 


7/9/20 11:58 97.9 98 20 134/74 (94) 100   


 


7/9/20 08:00      Nasal Cannula 2.0 


 


7/9/20 08:00 97.9 95 21 128/75 (92) 99   


 


7/9/20 08:00  91      

















Intake and Output  


 


 7/9/20 7/10/20





 19:00 07:00


 


Intake Total 75 ml 620.0 ml


 


Output Total 1500 ml 1600 ml


 


Balance -1425 ml -980.0 ml


 


  


 


IV Total 75 ml 620.0 ml


 


Output Urine Total 1500 ml 1600 ml


 


# Bowel Movements  1








Objective


WDWN


NAD


clear breath sounds bilaterally without rhonchi or wheeze


[id number] without MRG


NABS nontender no HSM


no CCE


nonfocal








Laboratory Tests


7/9/20 08:26: 


Arterial Blood pH 7.476H, Arterial Blood Partial Pressure CO2 28.4L, Arterial 

Blood Partial Pressure O2 72.7L, Arterial Blood HCO3 20.5L, Arterial Blood 

Oxygen Saturation 94.4L, Arterial Blood Base Excess -2.3L, Alexandr Test Positive


7/9/20 11:36: POC Whole Blood Glucose 131H


7/9/20 16:55: Vancomycin Level Trough 16.1H


7/9/20 17:09: POC Whole Blood Glucose 124H


7/9/20 22:00: Stool Occult Blood [Pending]


7/10/20 04:37: 


White Blood Count 9.2, Red Blood Count 3.65L, Hemoglobin 11.4L, Hematocrit 33.9L

, Mean Corpuscular Volume 93, Mean Corpuscular Hemoglobin 31.4H, Mean 

Corpuscular Hemoglobin Concent 33.7, Red Cell Distribution Width 12.3, Platelet 

Count 578H, Mean Platelet Volume 4.9L, Neutrophils (%) (Auto) 78.3H, 

Lymphocytes (%) (Auto) 10.8L, Monocytes (%) (Auto) 7.8, Eosinophils (%) (Auto) 

2.2, Basophils (%) (Auto) 0.8, Sodium Level 133L, Potassium Level 3.5, Chloride 

Level 100, Carbon Dioxide Level 22, Anion Gap 11, Blood Urea Nitrogen 5L, 

Creatinine 0.7, Estimat Glomerular Filtration Rate > 60, Glucose Level 111H, 

Calcium Level 8.0L, Magnesium Level 1.6L, Total Bilirubin 0.7, Aspartate Amino 

Transf (AST/SGOT) 36, Alanine Aminotransferase (ALT/SGPT) 34, Alkaline 

Phosphatase 184H, Pro-B-Type Natriuretic Peptide 1761H, Total Protein 6.8, 

Albumin 1.9L, Globulin 4.9, Albumin/Globulin Ratio 0.4L





Current Medications








 Medications


  (Trade)  Dose


 Ordered  Sig/Aron


 Route


 PRN Reason  Start Time


 Stop Time Status Last Admin


Dose Admin


 


 Dextrose


  (Dextrose 50%)  25 ml  Q30M  PRN


 IV


 Hypoglycemia  7/10/20 03:45


 10/5/20 09:44   


 


 


 Dextrose


  (Dextrose 50%)  50 ml  Q30M  PRN


 IV


 Hypoglycemia  7/10/20 03:45


 10/5/20 09:44   


 


 


 Dextrose/


 Electrolytes  1,000 ml @ 


 75 mls/hr  [id number]


 IV


   7/10/20 04:00


 8/8/20 03:59   


 


 


 Insulin Aspart


  (NovoLOG)    BEFORE MEALS AND  HS


 SUBQ


   7/10/20 06:30


 10/5/20 11:29   


 


 


 Iron Sucrose 100


 mg/Sodium Chloride  60 ml @ 


 240 mls/hr  BEDTIME


 IV


   7/10/20 21:00


 7/12/20 21:14   


 


 


 Pantoprazole


  (Protonix)  40 mg  DAILY


 IVP


   7/10/20 09:00


 8/5/20 08:59   


 


 


 Piperacillin Sod/


 Tazobactam Sod


 3.375 gm/Sodium


 Chloride  110 ml @ 


 27.5 mls/hr  EVERY 8  HOURS


 IVPB


   7/10/20 06:00


 7/13/20 10:29  7/10/20 05:31


 


 


 Vancomycin HCl


  ([facility]


 to dose)  1 ea  DAILY  PRN


 MISC


 Per rx protocol  7/10/20 09:00


 8/5/20 07:14   


 


 


 Vancomycin HCl 1


 gm/Sodium Chloride  275 ml @ 


 183.708


 mls/hr  Q12HR@0600,1800


 IVPB


   7/10/20 06:00


 7/13/20 05:59  7/10/20 05:32


 











Assessment/Plan


Assessment/Plan





IMPRESSION:


1. Respiratory failure. resolved


2. Acute hypoxemia.


3. Significant pneumonia, negative COVID.


4. Severe protein-calorie malnutrition.


5. Elevated alkaline phosphatase, 


6. Significant anemia likely of chronic disease.


7. BPH.





PLAN


care noted


imaging reviewed


on oxygen 


respiratory care 


on isolation 


DVT prophylaxis


will follow up for worsening congestion 


impression, plan, and exam edited and reviewed in detail


care discussed with Cesar Winn MD Jul 10, 2020 07:46

## 2020-07-10 NOTE — INFECTIOUS DISEASES PROG NOTE
Assessment/Plan


Assessment/Plan


antibiotics : vancomycin iv, zosyn





A


1. pneumonia


COVID 19 rapid test negative x 2


2. diabetes mellitus


3. hypertension


4. bowel perforation





P


1. continue zosyn 2 more days


2. d/c iv vancomycin


3. will follow up cultures





Subjective


ROS Limited/Unobtainable:  Yes


Allergies:  


Coded Allergies:  


     No Known Allergies (Unverified , 7/6/20)





Objective





Last 24 Hour Vital Signs








  Date Time  Temp Pulse Resp B/P (MAP) Pulse Ox O2 Delivery O2 Flow Rate FiO2


 


7/10/20 08:00  102      


 


7/10/20 08:00 97.9 102 20 129/75 (93) 100   


 


7/10/20 05:32     100 Nasal Cannula 2.0 28


 


7/10/20 04:00  113      


 


7/10/20 04:00 99.0 112 20 143/80 (101) 99   


 


7/10/20 00:00 97.5 110 20 146/88 (107) 100   


 


7/10/20 00:00      Nasal Cannula 2.0 


 


7/9/20 23:31  108      


 


7/9/20 20:00      Nasal Cannula 2.0 


 


7/9/20 20:00  96      


 


7/9/20 20:00      Nasal Cannula 2.0 


 


7/9/20 19:58 97.3 105 21 145/86 (105) 100   


 


7/9/20 16:00      Nasal Cannula 2.0 


 


7/9/20 16:00  90      


 


7/9/20 16:00 97.9 97 21 139/82 (101) 99   


 


7/9/20 12:00      Nasal Cannula 2.0 


 


7/9/20 12:00  89      


 


7/9/20 11:58 97.9 98 20 134/74 (94) 100   








Height (Feet):  6


Weight (Pounds):  179


Respiratory/Chest:  lungs clear


Cardiovascular:  normal rate, regular rhythm, no gallop/murmur


Abdomen:  soft, non tender


Extremities:  no edema





Laboratory Tests








Test


  7/9/20


11:36 7/9/20


16:55 7/9/20


17:09 7/9/20


22:00


 


POC Whole Blood Glucose


  131 MG/DL


()  H 


  124 MG/DL


()  H 


 


 


Vancomycin Level Trough


  


  16.1 ug/mL


(5.0-12.0)  H 


  


 


 


Stool Occult Blood    Pending  


 


Test


  7/10/20


04:37 


  


  


 


 


White Blood Count


  9.2 K/UL


(4.8-10.8) 


  


  


 


 


Red Blood Count


  3.65 M/UL


(4.70-6.10)  L 


  


  


 


 


Hemoglobin


  11.4 G/DL


(14.2-18.0)  L 


  


  


 


 


Hematocrit


  33.9 %


(42.0-52.0)  L 


  


  


 


 


Mean Corpuscular Volume 93 FL (80-99)     


 


Mean Corpuscular Hemoglobin


  31.4 PG


(27.0-31.0)  H 


  


  


 


 


Mean Corpuscular Hemoglobin


Concent 33.7 G/DL


(32.0-36.0) 


  


  


 


 


Red Cell Distribution Width


  12.3 %


(11.6-14.8) 


  


  


 


 


Platelet Count


  578 K/UL


(150-450)  H 


  


  


 


 


Mean Platelet Volume


  4.9 FL


(6.5-10.1)  L 


  


  


 


 


Neutrophils (%) (Auto)


  78.3 %


(45.0-75.0)  H 


  


  


 


 


Lymphocytes (%) (Auto)


  10.8 %


(20.0-45.0)  L 


  


  


 


 


Monocytes (%) (Auto)


  7.8 %


(1.0-10.0) 


  


  


 


 


Eosinophils (%) (Auto)


  2.2 %


(0.0-3.0) 


  


  


 


 


Basophils (%) (Auto)


  0.8 %


(0.0-2.0) 


  


  


 


 


Sodium Level


  133 MMOL/L


(136-145)  L 


  


  


 


 


Potassium Level


  3.5 MMOL/L


(3.5-5.1) 


  


  


 


 


Chloride Level


  100 MMOL/L


() 


  


  


 


 


Carbon Dioxide Level


  22 MMOL/L


(21-32) 


  


  


 


 


Anion Gap


  11 mmol/L


(5-15) 


  


  


 


 


Blood Urea Nitrogen


  5 mg/dL (7-18)


L 


  


  


 


 


Creatinine


  0.7 MG/DL


(0.55-1.30) 


  


  


 


 


Estimat Glomerular Filtration


Rate > 60 mL/min


(>60) 


  


  


 


 


Glucose Level


  111 MG/DL


()  H 


  


  


 


 


Calcium Level


  8.0 MG/DL


(8.5-10.1)  L 


  


  


 


 


Magnesium Level


  1.6 MG/DL


(1.8-2.4)  L 


  


  


 


 


Total Bilirubin


  0.7 MG/DL


(0.2-1.0) 


  


  


 


 


Aspartate Amino Transf


(AST/SGOT) 36 U/L (15-37)


  


  


  


 


 


Alanine Aminotransferase


(ALT/SGPT) 34 U/L (12-78)


  


  


  


 


 


Alkaline Phosphatase


  184 U/L


()  H 


  


  


 


 


Pro-B-Type Natriuretic Peptide


  1761 pg/mL


(0-125)  H 


  


  


 


 


Total Protein


  6.8 G/DL


(6.4-8.2) 


  


  


 


 


Albumin


  1.9 G/DL


(3.4-5.0)  L 


  


  


 


 


Globulin 4.9 g/dL     


 


Albumin/Globulin Ratio


  0.4 (1.0-2.7)


L 


  


  


 











Current Medications








 Medications


  (Trade)  Dose


 Ordered  Sig/Aron


 Route


 PRN Reason  Start Time


 Stop Time Status Last Admin


Dose Admin


 


 Dextrose


  (Dextrose 50%)  25 ml  Q30M  PRN


 IV


 Hypoglycemia  7/10/20 03:45


 10/5/20 09:44   


 


 


 Dextrose


  (Dextrose 50%)  50 ml  Q30M  PRN


 IV


 Hypoglycemia  7/10/20 03:45


 10/5/20 09:44   


 


 


 Dextrose/


 Electrolytes  1,000 ml @ 


 75 mls/hr  [id number]


 IV


   7/10/20 04:00


 8/8/20 03:59   


 


 


 Insulin Aspart


  (NovoLOG)    BEFORE MEALS AND  HS


 SUBQ


   7/10/20 06:30


 10/5/20 11:29   


 


 


 Iron Sucrose 100


 mg/Sodium Chloride  60 ml @ 


 240 mls/hr  BEDTIME


 IV


   7/10/20 21:00


 7/12/20 21:14   


 


 


 Pantoprazole


  (Protonix)  40 mg  DAILY


 IVP


   7/10/20 09:00


 8/5/20 08:59  7/10/20 08:17


 


 


 Piperacillin Sod/


 Tazobactam Sod


 3.375 gm/Sodium


 Chloride  110 ml @ 


 27.5 mls/hr  EVERY 8  HOURS


 IVPB


   7/10/20 06:00


 7/13/20 10:29  7/10/20 05:31


 


 


 Vancomycin HCl


  ([facility]


 to dose)  1 ea  DAILY  PRN


 MISC


 Per rx protocol  7/10/20 09:00


 8/5/20 07:14   


 


 


 Vancomycin HCl 1


 gm/Sodium Chloride  275 ml @ 


 183.708


 mls/hr  Q12HR@0600,1800


 IVPB


   7/10/20 06:00


 7/13/20 05:59  7/10/20 05:32


 

















Verónica Vale MD Jul 10, 2020 09:40

## 2020-07-11 VITALS — SYSTOLIC BLOOD PRESSURE: 156 MMHG | DIASTOLIC BLOOD PRESSURE: 87 MMHG

## 2020-07-11 VITALS — SYSTOLIC BLOOD PRESSURE: 154 MMHG | DIASTOLIC BLOOD PRESSURE: 91 MMHG

## 2020-07-11 VITALS — SYSTOLIC BLOOD PRESSURE: 136 MMHG | DIASTOLIC BLOOD PRESSURE: 74 MMHG

## 2020-07-11 VITALS — DIASTOLIC BLOOD PRESSURE: 65 MMHG | SYSTOLIC BLOOD PRESSURE: 133 MMHG

## 2020-07-11 VITALS — DIASTOLIC BLOOD PRESSURE: 85 MMHG | SYSTOLIC BLOOD PRESSURE: 150 MMHG

## 2020-07-11 VITALS — DIASTOLIC BLOOD PRESSURE: 85 MMHG | SYSTOLIC BLOOD PRESSURE: 148 MMHG

## 2020-07-11 LAB
ADD MANUAL DIFF: NO
ALBUMIN SERPL-MCNC: 1.5 G/DL (ref 3.4–5)
ALBUMIN/GLOB SERPL: 0.4 {RATIO} (ref 1–2.7)
ALP SERPL-CCNC: 176 U/L (ref 46–116)
ALT SERPL-CCNC: 24 U/L (ref 12–78)
ANION GAP SERPL CALC-SCNC: 9 MMOL/L (ref 5–15)
AST SERPL-CCNC: 30 U/L (ref 15–37)
BASOPHILS NFR BLD AUTO: 0.6 % (ref 0–2)
BILIRUB SERPL-MCNC: 0.4 MG/DL (ref 0.2–1)
BUN SERPL-MCNC: 6 MG/DL (ref 7–18)
CALCIUM SERPL-MCNC: 7.4 MG/DL (ref 8.5–10.1)
CHLORIDE SERPL-SCNC: 102 MMOL/L (ref 98–107)
CO2 SERPL-SCNC: 23 MMOL/L (ref 21–32)
CREAT SERPL-MCNC: 0.7 MG/DL (ref 0.55–1.3)
EOSINOPHIL NFR BLD AUTO: 3.2 % (ref 0–3)
ERYTHROCYTE [DISTWIDTH] IN BLOOD BY AUTOMATED COUNT: 12.4 % (ref 11.6–14.8)
GLOBULIN SER-MCNC: 4.2 G/DL
HCT VFR BLD CALC: 31.3 % (ref 42–52)
HGB BLD-MCNC: 10.2 G/DL (ref 14.2–18)
LYMPHOCYTES NFR BLD AUTO: 10.8 % (ref 20–45)
MCV RBC AUTO: 93 FL (ref 80–99)
MONOCYTES NFR BLD AUTO: 8.7 % (ref 1–10)
NEUTROPHILS NFR BLD AUTO: 76.7 % (ref 45–75)
PLATELET # BLD: 508 K/UL (ref 150–450)
POTASSIUM SERPL-SCNC: 3.3 MMOL/L (ref 3.5–5.1)
RBC # BLD AUTO: 3.35 M/UL (ref 4.7–6.1)
SODIUM SERPL-SCNC: 134 MMOL/L (ref 136–145)
WBC # BLD AUTO: 7.8 K/UL (ref 4.8–10.8)

## 2020-07-11 RX ADMIN — DEXTROSE MONOHYDRATE SCH MLS/HR: 50 INJECTION, SOLUTION INTRAVENOUS at 05:18

## 2020-07-11 RX ADMIN — DEXTROSE MONOHYDRATE SCH MLS/HR: 50 INJECTION, SOLUTION INTRAVENOUS at 14:34

## 2020-07-11 RX ADMIN — PANTOPRAZOLE SODIUM SCH MG: 40 INJECTION, POWDER, FOR SOLUTION INTRAVENOUS at 08:55

## 2020-07-11 RX ADMIN — INSULIN ASPART SCH UNITS: 100 INJECTION, SOLUTION INTRAVENOUS; SUBCUTANEOUS at 17:11

## 2020-07-11 RX ADMIN — INSULIN ASPART SCH UNITS: 100 INJECTION, SOLUTION INTRAVENOUS; SUBCUTANEOUS at 06:30

## 2020-07-11 RX ADMIN — METOPROLOL TARTRATE SCH MG: 50 TABLET, FILM COATED ORAL at 20:19

## 2020-07-11 RX ADMIN — DEXTROSE MONOHYDRATE SCH MLS/HR: 50 INJECTION, SOLUTION INTRAVENOUS at 21:47

## 2020-07-11 RX ADMIN — SODIUM CHLORIDE SCH MLS/HR: 0.9 INJECTION INTRAVENOUS at 20:22

## 2020-07-11 RX ADMIN — INSULIN ASPART SCH UNITS: 100 INJECTION, SOLUTION INTRAVENOUS; SUBCUTANEOUS at 13:08

## 2020-07-11 RX ADMIN — INSULIN ASPART SCH UNITS: 100 INJECTION, SOLUTION INTRAVENOUS; SUBCUTANEOUS at 20:30

## 2020-07-11 NOTE — PULMONOLOGY PROGRESS NOTE
Subjective


ROS Limited/Unobtainable:  Yes


Constitutional:  Denies: fever


Allergies:  


Coded Allergies:  


     No Known Allergies (Unverified , 7/6/20)


All Systems:  reviewed and negative except above


Subjective


overnight events reviewed


care noted


on oxygen and stable


no distress at present





Objective





Last 24 Hour Vital Signs








  Date Time  Temp Pulse Resp B/P (MAP) Pulse Ox O2 Delivery O2 Flow Rate FiO2


 


7/11/20 09:00      Nasal Cannula 2.0 


 


7/11/20 08:54  102  136/74    


 


7/11/20 08:00 97.5 102 20 136/74 (94) 95   


 


7/11/20 04:00      Nasal Cannula 2.0 


 


7/11/20 04:00 97.9 87 20 150/85 (106) 94   


 


7/11/20 04:00  109      


 


7/11/20 00:00      Nasal Cannula 2.0 


 


7/11/20 00:00  113      


 


7/11/20 00:00 98.9 111 20 148/85 (106) 100   


 


7/10/20 21:00      Nasal Cannula 2.0 


 


7/10/20 20:00 98.6 115 22 130/80 (97) 100   


 


7/10/20 20:00  113      


 


7/10/20 19:45     100 Nasal Cannula 2.0 28


 


7/10/20 16:00 97.9 108 22 143/85 (104) 99   


 


7/10/20 16:00  111      


 


7/10/20 12:00  99      


 


7/10/20 12:00 97.9 99 22 135/74 (94) 99   

















Intake and Output  


 


 7/10/20 7/11/20





 18:59 06:59


 


Intake Total 1180 ml 1275.0 ml


 


Output Total 1400 ml 900 ml


 


Balance -220 ml 375.0 ml


 


  


 


Free Water 90 ml 180 ml


 


IV Total 900 ml 695.0 ml


 


Tube Feeding 190 ml 400 ml


 


Output Urine Total 1400 ml 900 ml


 


# Bowel Movements  1








Objective


WDWN


NAD


clear breath sounds bilaterally without rhonchi or wheeze


[id number] without MRG


NABS nontender no HSM


no CCE


nonfocal











Microbiology








 Date/Time


Source Procedure


Growth Status


 


 


 7/8/20 16:00


Sputum Gram Stain - Final Resulted


 


 7/8/20 16:00


Sputum Sputum Culture - Preliminary


NO GROWTH Resulted








Laboratory Tests


7/10/20 12:04: POC Whole Blood Glucose 117H


7/10/20 15:55: POC Whole Blood Glucose 123H


7/10/20 18:48: POC Whole Blood Glucose 133H


7/10/20 20:35: POC Whole Blood Glucose 146H


7/11/20 04:30: 


White Blood Count 7.8, Red Blood Count 3.35L, Hemoglobin 10.2L, Hematocrit 31.3L

, Mean Corpuscular Volume 93, Mean Corpuscular Hemoglobin 30.5, Mean 

Corpuscular Hemoglobin Concent 32.6, Red Cell Distribution Width 12.4, Platelet 

Count 508H, Mean Platelet Volume 4.7L, Neutrophils (%) (Auto) 76.7H, 

Lymphocytes (%) (Auto) 10.8L, Monocytes (%) (Auto) 8.7, Eosinophils (%) (Auto) 

3.2H, Basophils (%) (Auto) 0.6, Sodium Level 134L, Potassium Level 3.3L, 

Chloride Level 102, Carbon Dioxide Level 23, Anion Gap 9, Blood Urea Nitrogen 6L

, Creatinine 0.7, Estimat Glomerular Filtration Rate > 60, Glucose Level 144H, 

Calcium Level 7.4L, Total Bilirubin 0.4, Aspartate Amino Transf (AST/SGOT) 30, 

Alanine Aminotransferase (ALT/SGPT) 24, Alkaline Phosphatase 176H, Total 

Protein 5.7L, Albumin 1.5L, Globulin 4.2, Albumin/Globulin Ratio 0.4L





Current Medications








 Medications


  (Trade)  Dose


 Ordered  Sig/Aron


 Route


 PRN Reason  Start Time


 Stop Time Status Last Admin


Dose Admin


 


 Barium Sulfate


  (Varibar Honey)  250 ml  NOW  PRN


 


 RAD  7/10/20 13:45


 7/13/20 13:40   


 


 


 Barium Sulfate


  (Varibar Nectar)  240 ml  NOW  PRN


 


 RAD  7/10/20 13:45


 7/13/20 13:40   


 


 


 Barium Sulfate


  (Varibar Pudding)  230 ml  NOW  PRN


 MC


 RAD  7/10/20 13:45


 7/13/20 13:40   


 


 


 Dextrose


  (Dextrose 50%)  25 ml  Q30M  PRN


 IV


 Hypoglycemia  7/10/20 03:45


 10/5/20 09:44   


 


 


 Dextrose


  (Dextrose 50%)  50 ml  Q30M  PRN


 IV


 Hypoglycemia  7/10/20 03:45


 10/5/20 09:44   


 


 


 Insulin Aspart


  (NovoLOG)    BEFORE MEALS AND  HS


 SUBQ


   7/10/20 06:30


 10/5/20 11:29   


 


 


 Iron Sucrose 100


 mg/Sodium Chloride  60 ml @ 


 240 mls/hr  BEDTIME


 IV


   7/10/20 21:00


 7/12/20 21:14  7/10/20 20:39


 


 


 Metoprolol


 Tartrate


  (Lopressor)  25 mg  Q12HR


 ORAL


   7/11/20 09:00


 10/9/20 08:59  7/11/20 08:54


 


 


 Pantoprazole


  (Protonix)  40 mg  DAILY


 IVP


   7/10/20 09:00


 8/5/20 08:59  7/11/20 08:55


 


 


 Piperacillin Sod/


 Tazobactam Sod


 3.375 gm/Sodium


 Chloride  110 ml @ 


 27.5 mls/hr  EVERY 8  HOURS


 IVPB


   7/10/20 06:00


 7/13/20 10:29  7/11/20 05:18


 











Assessment/Plan


Assessment/Plan





IMPRESSION:


1. Respiratory failure. 


2. Acute hypoxemia.


3. Significant pneumonia, negative COVID.


4. Severe protein-calorie malnutrition.


5. Elevated alkaline phosphatase, 


6. Significant anemia likely of chronic disease.


7. BPH.


8. Pleural effusion 





PLAN


care noted


imaging reviewed and will monitor effusion


on oxygen 


respiratory care 


on isolation 


DVT prophylaxis


will follow up 


impression, plan, and exam edited and reviewed in detail


care discussed with Cesar Winn MD Jul 11, 2020 10:04

## 2020-07-11 NOTE — PROGRESS NOTE
DATE:  07/11/2020

CARDIOLOGY PROGRESS NOTE



SUBJECTIVE:  The patient remains on nasal cannula.  No distress.  Blood

pressure parameters remained stable.  Monitored rhythm, sinus tachycardia,

atrial ectopy.



PHYSICAL EXAMINATION:

VITAL SIGNS:  Blood pressure 133/65, heart rate 93, respirations 18,

afebrile.

LUNGS:  Bilateral breath sounds with rhonchi.

CARDIAC:  Regular rhythm.  Rapid rate.  Normal S1, S2.

ABDOMEN:  Soft.  No edema.



LABORATORY DATA:  White count 7.8, hemoglobin 10.2.  Sodium 134, potassium

3.3, BUN 6, creatinine 0.7.  Albumin 1.5.  Cultures remain negative.



IMPRESSION:

1. Healthcare-associated pneumonia.

2. Recovered ileus.

3. Recent bowel perforation.

4. Severe sepsis, resolved.

5. Sinus tachycardia, improved.

6. Anemia status post transfusion.

7. Type 2 diabetes mellitus with good better blood glucose levels.

8. Acute myocardial ischemia, resolved.

9. Hypokalemia.

10. Acute on chronic diastolic congestive heart failure.



PLAN:

1. Antimicrobials.

2. Respiratory hygiene.

3. Taper oxygen.

4. Potassium replacement.

5. Recheck laboratory studies.

6. Off IV fluids.

7. Diet has been advanced, protein supplement.

8. Reassess for diuresis.









  ______________________________________________

  Theron Mckenzie M.D.





DR:  ROSY

D:  07/11/2020 19:21

T:  07/11/2020 21:17

JOB#:  [id number]

CC:

## 2020-07-11 NOTE — SURGERY PROGRESS NOTE
Surgery Progress Note


Subjective


Additional Comments


no acute events


labs noted


imaging reviewed





Objective





Last 24 Hour Vital Signs








  Date Time  Temp Pulse Resp B/P (MAP) Pulse Ox O2 Delivery O2 Flow Rate FiO2


 


7/11/20 12:00  91      


 


7/11/20 11:38 98.1 93 18 133/65 (87) 96   


 


7/11/20 09:00      Nasal Cannula 2.0 


 


7/11/20 08:54  102  136/74    


 


7/11/20 08:00  103      


 


7/11/20 08:00 97.5 102 20 136/74 (94) 95   


 


7/11/20 04:00      Nasal Cannula 2.0 


 


7/11/20 04:00 97.9 87 20 150/85 (106) 94   


 


7/11/20 04:00  109      


 


7/11/20 00:00      Nasal Cannula 2.0 


 


7/11/20 00:00  113      


 


7/11/20 00:00 98.9 111 20 148/85 (106) 100   


 


7/10/20 21:00      Nasal Cannula 2.0 


 


7/10/20 20:00 98.6 115 22 130/80 (97) 100   


 


7/10/20 20:00  113      


 


7/10/20 19:45     100 Nasal Cannula 2.0 28


 


7/10/20 16:00 97.9 108 22 143/85 (104) 99   


 


7/10/20 16:00  111      








I&O











Intake and Output  


 


 7/10/20 7/11/20





 19:00 07:00


 


Intake Total 1270 ml 1110.0 ml


 


Output Total 1400 ml 900 ml


 


Balance -130 ml 210.0 ml


 


  


 


Free Water 150 ml 120 ml


 


IV Total 900 ml 620.0 ml


 


Tube Feeding 220 ml 370 ml


 


Output Urine Total 1400 ml 900 ml


 


# Bowel Movements  1








Dressing:  other


Wound:  other


Drains:  other


Cardiovascular:  RSR


Respiratory:  decreased breath sounds


Abdomen:  soft, non-tender, present bowel sounds


Extremities:  no cyanosis





Laboratory Tests








Test


  7/10/20


15:55 7/10/20


18:48 7/10/20


20:35 7/11/20


04:30


 


POC Whole Blood Glucose


  123 MG/DL


()  H 133 MG/DL


()  H 146 MG/DL


()  H 


 


 


White Blood Count


  


  


  


  7.8 K/UL


(4.8-10.8)


 


Red Blood Count


  


  


  


  3.35 M/UL


(4.70-6.10)  L


 


Hemoglobin


  


  


  


  10.2 G/DL


(14.2-18.0)  L


 


Hematocrit


  


  


  


  31.3 %


(42.0-52.0)  L


 


Mean Corpuscular Volume    93 FL (80-99)  


 


Mean Corpuscular Hemoglobin


  


  


  


  30.5 PG


(27.0-31.0)


 


Mean Corpuscular Hemoglobin


Concent 


  


  


  32.6 G/DL


(32.0-36.0)


 


Red Cell Distribution Width


  


  


  


  12.4 %


(11.6-14.8)


 


Platelet Count


  


  


  


  508 K/UL


(150-450)  H


 


Mean Platelet Volume


  


  


  


  4.7 FL


(6.5-10.1)  L


 


Neutrophils (%) (Auto)


  


  


  


  76.7 %


(45.0-75.0)  H


 


Lymphocytes (%) (Auto)


  


  


  


  10.8 %


(20.0-45.0)  L


 


Monocytes (%) (Auto)


  


  


  


  8.7 %


(1.0-10.0)


 


Eosinophils (%) (Auto)


  


  


  


  3.2 %


(0.0-3.0)  H


 


Basophils (%) (Auto)


  


  


  


  0.6 %


(0.0-2.0)


 


Sodium Level


  


  


  


  134 MMOL/L


(136-145)  L


 


Potassium Level


  


  


  


  3.3 MMOL/L


(3.5-5.1)  L


 


Chloride Level


  


  


  


  102 MMOL/L


()


 


Carbon Dioxide Level


  


  


  


  23 MMOL/L


(21-32)


 


Anion Gap


  


  


  


  9 mmol/L


(5-15)


 


Blood Urea Nitrogen


  


  


  


  6 mg/dL (7-18)


L


 


Creatinine


  


  


  


  0.7 MG/DL


(0.55-1.30)


 


Estimat Glomerular Filtration


Rate 


  


  


  > 60 mL/min


(>60)


 


Glucose Level


  


  


  


  144 MG/DL


()  H


 


Calcium Level


  


  


  


  7.4 MG/DL


(8.5-10.1)  L


 


Total Bilirubin


  


  


  


  0.4 MG/DL


(0.2-1.0)


 


Aspartate Amino Transf


(AST/SGOT) 


  


  


  30 U/L (15-37)


 


 


Alanine Aminotransferase


(ALT/SGPT) 


  


  


  24 U/L (12-78)


 


 


Alkaline Phosphatase


  


  


  


  176 U/L


()  H


 


Total Protein


  


  


  


  5.7 G/DL


(6.4-8.2)  L


 


Albumin


  


  


  


  1.5 G/DL


(3.4-5.0)  L


 


Globulin    4.2 g/dL  


 


Albumin/Globulin Ratio


  


  


  


  0.4 (1.0-2.7)


L


 


Test


  7/11/20


13:05 


  


  


 


 


POC Whole Blood Glucose


  146 MG/DL


()  H 


  


  


 











Plan


Problems:  


(1) Anemia


(2) Sepsis


Assessment & Plan:  febrile, tachycardic, abnormal labs, sob, respiratory 

decline


covid neg in ED


recent colonoscopy with perf per report treated conservatively


abd exam limited given medical condition but seemingly benign


non distended, no pain upon exam





CXR noted.  


labs reviewed





KUB pending 


does not seem to be having acute abdominal process but concerning and ddx given 

recent history


no acute surgical intervention planned


will follow with serial exams and recs


thank you 





HILDA noted.. . likely ileus


repeat in AM


hold feeds for now


IV fluids


discussed with team 





KUFORD reviewed improved ileus improved


Tube feeds resumed will monitor residuals and tolerance





+BM


tube feeds as tolerated 





(3) Acute respiratory failure with hypoxia


(4) HCAP (healthcare-associated pneumonia)


(5) Fever


(6) Shortness of breath


(7) Tachycardia


(8) Suspected COVID-19 virus infection











Anthony Dias Jul 11, 2020 13:50

## 2020-07-11 NOTE — PROGRESS NOTE
DATE:  07/10/2020

CARDIOLOGY PROGRESS NOTE



SUBJECTIVE:  The patient had swallow evaluation, feedings have been

initiated.  His blood pressure parameters remained stable.  Cardiac

monitoring, sinus tachycardia.



PHYSICAL EXAMINATION:

LUNGS:  Bilateral breath sounds with rhonchi.

HEART:  Regular rhythm.  Rapid rate.  Normal S1, S2.

ABDOMEN:  Soft.  Trace edema.



LABORATORY AND DIAGNOSTIC DATA:  White count 9.2, hemoglobin 11.4.  Glucose

in the 120 range.  Sodium 133, potassium 3.5, magnesium 1.6.  BUN 5,

creatinine 0.7.  Pro natriuretic peptide 1761.



IMPRESSION:

1. Recovering ileus.

2. Sepsis.

3. Healthcare associated pneumonia.

4. Severe protein-calorie malnutrition.

5. Paroxysmal atrial fibrillation.

6. Sinus tachycardia.

7. Hypokalemia.

8. Hypomagnesemia.

9. Status post recent bowel perforation.



PLAN:

1. Advance feedings as tolerated and taper off IV fluids.

2. Additional IV magnesium.

3. Additional potassium replacement.

4. Titrate cardiovascular regimen to optimize volume status  and blood

pressure control.









  ______________________________________________

  Theron Mckenzie M.D.





DR:  Michel

D:  07/11/2020 01:50

T:  07/11/2020 02:39

JOB#:  [id number]

CC:

## 2020-07-11 NOTE — GENERAL PROGRESS NOTE
Assessment/Plan


Problem List:  


(1) Bowel perforation


ICD Codes:  K63.1 - Perforation of intestine (nontraumatic)


SNOMED:  75527228


(2) Anemia


ICD Codes:  D64.9 - Anemia, unspecified


SNOMED:  399461723, 055645280


Qualifiers:  


   Qualified Codes:  D64.9 - Anemia, unspecified


(3) Sepsis


ICD Codes:  A41.9 - Sepsis, unspecified organism


SNOMED:  71400286, 665723775


Qualifiers:  


   Qualified Codes:  A41.9 - Sepsis, unspecified organism; R65.20 - Severe 

sepsis without septic shock; J96.01 - Acute respiratory failure with hypoxia


(4) Tachycardia


ICD Codes:  R00.0 - Tachycardia, unspecified


SNOMED:  6228462


(5) Fever


ICD Codes:  R50.9 - Fever, unspecified


SNOMED:  573986449


(6) HCAP (healthcare-associated pneumonia)


ICD Codes:  J18.9 - Pneumonia, unspecified organism


SNOMED:  549367592, 923088452


(7) Acute respiratory failure with hypoxia


ICD Codes:  J96.01 - Acute respiratory failure with hypoxia


SNOMED:  62300800, 594678873


Status:  stable


Assessment/Plan:


stable/improving


cont current rx


wean o2


resp rx


suctioning as needed


IVF/tube feeds


monitor h/h


check iron panel and stool ob


transfuse as needed


replace k


PPI


dc planning tomorrow or monday





Subjective


ROS Limited/Unobtainable:  Yes


Constitutional:  Reports: malaise, weakness


HEENT:  Reports: no symptoms


Cardiovascular:  Reports: no symptoms


Respiratory:  Reports: cough


Gastrointestinal/Abdominal:  Reports: difficulty swallowing


Genitourinary:  Reports: no symptoms


Neurologic/Psychiatric:  Reports: pre-existing deficit


Endocrine:  Reports: no symptoms


Hematologic/Lymphatic:  Reports: anemia


Allergies:  


Coded Allergies:  


     No Known Allergies (Unverified , 7/6/20)


All Systems:  reviewed and negative except above


Subjective


No overnight events.  Remained stable on the NC.  No fevers or chills.  Labs 

reviewed.  Noted to have low potassium.  Remains on IV antibiotics.  Cleared 

for tube feeds by surgery.





Objective





Last 24 Hour Vital Signs








  Date Time  Temp Pulse Resp B/P (MAP) Pulse Ox O2 Delivery O2 Flow Rate FiO2


 


7/11/20 12:00  91      


 


7/11/20 11:38 98.1 93 18 133/65 (87) 96   


 


7/11/20 09:00      Nasal Cannula 2.0 


 


7/11/20 08:54  102  136/74    


 


7/11/20 08:00  103      


 


7/11/20 08:00 97.5 102 20 136/74 (94) 95   


 


7/11/20 04:00      Nasal Cannula 2.0 


 


7/11/20 04:00 97.9 87 20 150/85 (106) 94   


 


7/11/20 04:00  109      


 


7/11/20 00:00      Nasal Cannula 2.0 


 


7/11/20 00:00  113      


 


7/11/20 00:00 98.9 111 20 148/85 (106) 100   


 


7/10/20 21:00      Nasal Cannula 2.0 


 


7/10/20 20:00 98.6 115 22 130/80 (97) 100   


 


7/10/20 20:00  113      


 


7/10/20 19:45     100 Nasal Cannula 2.0 28

















Intake and Output  


 


 7/10/20 7/11/20





 19:00 07:00


 


Intake Total 1270 ml 1110.0 ml


 


Output Total 1400 ml 900 ml


 


Balance -130 ml 210.0 ml


 


  


 


Free Water 150 ml 120 ml


 


IV Total 900 ml 620.0 ml


 


Tube Feeding 220 ml 370 ml


 


Output Urine Total 1400 ml 900 ml


 


# Bowel Movements  1








Laboratory Tests


7/10/20 18:48: POC Whole Blood Glucose 133H


7/10/20 20:35: POC Whole Blood Glucose 146H


7/11/20 04:30: 


White Blood Count 7.8, Red Blood Count 3.35L, Hemoglobin 10.2L, Hematocrit 31.3L

, Mean Corpuscular Volume 93, Mean Corpuscular Hemoglobin 30.5, Mean 

Corpuscular Hemoglobin Concent 32.6, Red Cell Distribution Width 12.4, Platelet 

Count 508H, Mean Platelet Volume 4.7L, Neutrophils (%) (Auto) 76.7H, 

Lymphocytes (%) (Auto) 10.8L, Monocytes (%) (Auto) 8.7, Eosinophils (%) (Auto) 

3.2H, Basophils (%) (Auto) 0.6, Sodium Level 134L, Potassium Level 3.3L, 

Chloride Level 102, Carbon Dioxide Level 23, Anion Gap 9, Blood Urea Nitrogen 6L

, Creatinine 0.7, Estimat Glomerular Filtration Rate > 60, Glucose Level 144H, 

Calcium Level 7.4L, Total Bilirubin 0.4, Aspartate Amino Transf (AST/SGOT) 30, 

Alanine Aminotransferase (ALT/SGPT) 24, Alkaline Phosphatase 176H, Total 

Protein 5.7L, Albumin 1.5L, Globulin 4.2, Albumin/Globulin Ratio 0.4L


7/11/20 13:05: POC Whole Blood Glucose 146H


Height (Feet):  6


Weight (Pounds):  179


Objective


General Appearance:  WD/WN, lethargic, confused, cachetic


EENT:  PERRL/EOMI, normal ENT inspection


Neck:  non-tender, normal alignment


Cardiovascular:  normal rate, regular rhythm


Respiratory/Chest:  no respiratory distress, no accessory muscle use, rhonchi - 

bilaterally


Abdomen:  normal bowel sounds, non tender, soft, no organomegaly, no mass


Edema:  no edema noted Arm (L), no edema noted Arm (R), no edema noted Leg (L), 

no edema noted Leg (R), no edema noted Pedal (L), no edema noted Pedal (R), no 

edema noted Generalized


Neurologic:  disoriented, aphasia


Skin:  normal pigmentation


Lymphatic:  normal anterior cervical (L), normal anterior cervical (R)











Vel Boudreaux MD Jul 11, 2020 16:34

## 2020-07-11 NOTE — INFECTIOUS DISEASES PROG NOTE
Assessment/Plan


Assessment/Plan


antibiotics : zosyn





A


1. pneumonia


COVID 19 rapid test negative x 2


2. diabetes mellitus


3. hypertension


4. bowel perforation





P


1. continue zosyn 1 more day


2. will follow up cultures





Subjective


ROS Limited/Unobtainable:  Yes


Allergies:  


Coded Allergies:  


     No Known Allergies (Unverified , 7/6/20)





Objective





Last 24 Hour Vital Signs








  Date Time  Temp Pulse Resp B/P (MAP) Pulse Ox O2 Delivery O2 Flow Rate FiO2


 


7/11/20 09:00      Nasal Cannula 2.0 


 


7/11/20 08:54  102  136/74    


 


7/11/20 08:00 97.5 102 20 136/74 (94) 95   


 


7/11/20 04:00      Nasal Cannula 2.0 


 


7/11/20 04:00 97.9 87 20 150/85 (106) 94   


 


7/11/20 04:00  109      


 


7/11/20 00:00      Nasal Cannula 2.0 


 


7/11/20 00:00  113      


 


7/11/20 00:00 98.9 111 20 148/85 (106) 100   


 


7/10/20 21:00      Nasal Cannula 2.0 


 


7/10/20 20:00 98.6 115 22 130/80 (97) 100   


 


7/10/20 20:00  113      


 


7/10/20 19:45     100 Nasal Cannula 2.0 28


 


7/10/20 16:00 97.9 108 22 143/85 (104) 99   


 


7/10/20 16:00  111      


 


7/10/20 12:00  99      


 


7/10/20 12:00 97.9 99 22 135/74 (94) 99   








Height (Feet):  6


Weight (Pounds):  179


Respiratory/Chest:  lungs clear


Cardiovascular:  normal rate, no gallop/murmur


Abdomen:  soft, non tender


Extremities:  no edema





Microbiology








 Date/Time


Source Procedure


Growth Status


 


 


 7/8/20 16:00


Sputum Gram Stain - Final Resulted


 


 7/8/20 16:00


Sputum Sputum Culture - Preliminary


NO GROWTH Resulted











Laboratory Tests








Test


  7/10/20


12:04 7/10/20


15:55 7/10/20


18:48 7/10/20


20:35


 


POC Whole Blood Glucose


  117 MG/DL


()  H 123 MG/DL


()  H 133 MG/DL


()  H 146 MG/DL


()  H


 


Test


  7/11/20


04:30 


  


  


 


 


White Blood Count


  7.8 K/UL


(4.8-10.8) 


  


  


 


 


Red Blood Count


  3.35 M/UL


(4.70-6.10)  L 


  


  


 


 


Hemoglobin


  10.2 G/DL


(14.2-18.0)  L 


  


  


 


 


Hematocrit


  31.3 %


(42.0-52.0)  L 


  


  


 


 


Mean Corpuscular Volume 93 FL (80-99)     


 


Mean Corpuscular Hemoglobin


  30.5 PG


(27.0-31.0) 


  


  


 


 


Mean Corpuscular Hemoglobin


Concent 32.6 G/DL


(32.0-36.0) 


  


  


 


 


Red Cell Distribution Width


  12.4 %


(11.6-14.8) 


  


  


 


 


Platelet Count


  508 K/UL


(150-450)  H 


  


  


 


 


Mean Platelet Volume


  4.7 FL


(6.5-10.1)  L 


  


  


 


 


Neutrophils (%) (Auto)


  76.7 %


(45.0-75.0)  H 


  


  


 


 


Lymphocytes (%) (Auto)


  10.8 %


(20.0-45.0)  L 


  


  


 


 


Monocytes (%) (Auto)


  8.7 %


(1.0-10.0) 


  


  


 


 


Eosinophils (%) (Auto)


  3.2 %


(0.0-3.0)  H 


  


  


 


 


Basophils (%) (Auto)


  0.6 %


(0.0-2.0) 


  


  


 


 


Sodium Level


  134 MMOL/L


(136-145)  L 


  


  


 


 


Potassium Level


  3.3 MMOL/L


(3.5-5.1)  L 


  


  


 


 


Chloride Level


  102 MMOL/L


() 


  


  


 


 


Carbon Dioxide Level


  23 MMOL/L


(21-32) 


  


  


 


 


Anion Gap


  9 mmol/L


(5-15) 


  


  


 


 


Blood Urea Nitrogen


  6 mg/dL (7-18)


L 


  


  


 


 


Creatinine


  0.7 MG/DL


(0.55-1.30) 


  


  


 


 


Estimat Glomerular Filtration


Rate > 60 mL/min


(>60) 


  


  


 


 


Glucose Level


  144 MG/DL


()  H 


  


  


 


 


Calcium Level


  7.4 MG/DL


(8.5-10.1)  L 


  


  


 


 


Total Bilirubin


  0.4 MG/DL


(0.2-1.0) 


  


  


 


 


Aspartate Amino Transf


(AST/SGOT) 30 U/L (15-37)


  


  


  


 


 


Alanine Aminotransferase


(ALT/SGPT) 24 U/L (12-78)


  


  


  


 


 


Alkaline Phosphatase


  176 U/L


()  H 


  


  


 


 


Total Protein


  5.7 G/DL


(6.4-8.2)  L 


  


  


 


 


Albumin


  1.5 G/DL


(3.4-5.0)  L 


  


  


 


 


Globulin 4.2 g/dL     


 


Albumin/Globulin Ratio


  0.4 (1.0-2.7)


L 


  


  


 











Current Medications








 Medications


  (Trade)  Dose


 Ordered  Sig/Aron


 Route


 PRN Reason  Start Time


 Stop Time Status Last Admin


Dose Admin


 


 Barium Sulfate


  (Varibar Honey)  250 ml  NOW  PRN


 MC


 RAD  7/10/20 13:45


 7/13/20 13:40   


 


 


 Barium Sulfate


  (Varibar Nectar)  240 ml  NOW  PRN


 MC


 RAD  7/10/20 13:45


 7/13/20 13:40   


 


 


 Barium Sulfate


  (Varibar Pudding)  230 ml  NOW  PRN


 MC


 RAD  7/10/20 13:45


 7/13/20 13:40   


 


 


 Dextrose


  (Dextrose 50%)  25 ml  Q30M  PRN


 IV


 Hypoglycemia  7/10/20 03:45


 10/5/20 09:44   


 


 


 Dextrose


  (Dextrose 50%)  50 ml  Q30M  PRN


 IV


 Hypoglycemia  7/10/20 03:45


 10/5/20 09:44   


 


 


 Insulin Aspart


  (NovoLOG)    BEFORE MEALS AND  HS


 SUBQ


   7/10/20 06:30


 10/5/20 11:29   


 


 


 Iron Sucrose 100


 mg/Sodium Chloride  60 ml @ 


 240 mls/hr  BEDTIME


 IV


   7/10/20 21:00


 7/12/20 21:14  7/10/20 20:39


 


 


 Metoprolol


 Tartrate


  (Lopressor)  25 mg  Q12HR


 ORAL


   7/11/20 09:00


 10/9/20 08:59  7/11/20 08:54


 


 


 Pantoprazole


  (Protonix)  40 mg  DAILY


 IVP


   7/10/20 09:00


 8/5/20 08:59  7/11/20 08:55


 


 


 Piperacillin Sod/


 Tazobactam Sod


 3.375 gm/Sodium


 Chloride  110 ml @ 


 27.5 mls/hr  EVERY 8  HOURS


 IVPB


   7/10/20 06:00


 7/13/20 10:29  7/11/20 05:18


 

















Verónica Vale MD Jul 11, 2020 10:14

## 2020-07-12 VITALS — DIASTOLIC BLOOD PRESSURE: 60 MMHG | SYSTOLIC BLOOD PRESSURE: 102 MMHG

## 2020-07-12 VITALS — DIASTOLIC BLOOD PRESSURE: 83 MMHG | SYSTOLIC BLOOD PRESSURE: 144 MMHG

## 2020-07-12 VITALS — DIASTOLIC BLOOD PRESSURE: 65 MMHG | SYSTOLIC BLOOD PRESSURE: 117 MMHG

## 2020-07-12 VITALS — DIASTOLIC BLOOD PRESSURE: 63 MMHG | SYSTOLIC BLOOD PRESSURE: 109 MMHG

## 2020-07-12 LAB
ADD MANUAL DIFF: NO
ALBUMIN SERPL-MCNC: 2 G/DL (ref 3.4–5)
ALBUMIN/GLOB SERPL: 0.4 {RATIO} (ref 1–2.7)
ALP SERPL-CCNC: 227 U/L (ref 46–116)
ALT SERPL-CCNC: 28 U/L (ref 12–78)
ANION GAP SERPL CALC-SCNC: 11 MMOL/L (ref 5–15)
AST SERPL-CCNC: 25 U/L (ref 15–37)
BASOPHILS NFR BLD AUTO: 0.6 % (ref 0–2)
BILIRUB SERPL-MCNC: 0.4 MG/DL (ref 0.2–1)
BUN SERPL-MCNC: 12 MG/DL (ref 7–18)
CALCIUM SERPL-MCNC: 8.9 MG/DL (ref 8.5–10.1)
CHLORIDE SERPL-SCNC: 98 MMOL/L (ref 98–107)
CO2 SERPL-SCNC: 26 MMOL/L (ref 21–32)
CREAT SERPL-MCNC: 0.9 MG/DL (ref 0.55–1.3)
EOSINOPHIL NFR BLD AUTO: 2.3 % (ref 0–3)
ERYTHROCYTE [DISTWIDTH] IN BLOOD BY AUTOMATED COUNT: 13 % (ref 11.6–14.8)
GLOBULIN SER-MCNC: 5.1 G/DL
HCT VFR BLD CALC: 40.1 % (ref 42–52)
HGB BLD-MCNC: 13 G/DL (ref 14.2–18)
LYMPHOCYTES NFR BLD AUTO: 13.7 % (ref 20–45)
MCV RBC AUTO: 95 FL (ref 80–99)
MONOCYTES NFR BLD AUTO: 5.2 % (ref 1–10)
NEUTROPHILS NFR BLD AUTO: 78.1 % (ref 45–75)
PLATELET # BLD: 559 K/UL (ref 150–450)
POTASSIUM SERPL-SCNC: 4.3 MMOL/L (ref 3.5–5.1)
RBC # BLD AUTO: 4.23 M/UL (ref 4.7–6.1)
SODIUM SERPL-SCNC: 135 MMOL/L (ref 136–145)
WBC # BLD AUTO: 12.2 K/UL (ref 4.8–10.8)

## 2020-07-12 RX ADMIN — PANTOPRAZOLE SODIUM SCH MG: 40 INJECTION, POWDER, FOR SOLUTION INTRAVENOUS at 09:00

## 2020-07-12 RX ADMIN — DEXTROSE MONOHYDRATE SCH MLS/HR: 50 INJECTION, SOLUTION INTRAVENOUS at 05:57

## 2020-07-12 RX ADMIN — INSULIN ASPART SCH UNITS: 100 INJECTION, SOLUTION INTRAVENOUS; SUBCUTANEOUS at 11:30

## 2020-07-12 RX ADMIN — INSULIN ASPART SCH UNITS: 100 INJECTION, SOLUTION INTRAVENOUS; SUBCUTANEOUS at 05:58

## 2020-07-12 RX ADMIN — DEXTROSE MONOHYDRATE SCH MLS/HR: 50 INJECTION, SOLUTION INTRAVENOUS at 14:00

## 2020-07-12 RX ADMIN — METOPROLOL TARTRATE SCH MG: 50 TABLET, FILM COATED ORAL at 09:00

## 2020-07-12 NOTE — DIAGNOSTIC IMAGING REPORT
EXAM:

  XR Abdomen, one Views

 

CLINICAL HISTORY:

  NGT

 

TECHNIQUE:

  Frontal view of the abdomen/pelvis .

 

COMPARISON:

  KUB 7/8/20

 

FINDINGS:

  Lower thorax:  Bilateral hazy lung opacities.  Prominent mediastinum.

  Gastrointestinal tract:  Gaseous small bowel loops in the abdomen.  

  Bones/joints:  Unremarkable.

  Tubes, lines and devices:  Feeding tube tip is curled in the fundus of 

the stomach.

 

IMPRESSION:     

1.  Feeding tube tip is curled in the fundus of the stomach.

2.  Gaseous small bowel loops in the abdomen are increased.

3.  Bilateral hazy lung opacities.  Prominent mediastinum.

## 2020-07-12 NOTE — INFECTIOUS DISEASES PROG NOTE
Assessment/Plan


Assessment/Plan


A


1. pneumonia


   COVID 19  test negative x 2


2. diabetes mellitus


3. hypertension


4. Anemia


5. Hypoxic respiratory failure


6. Tachycardia





P


1. continue Zosyn


2.  will follow up cultures





Subjective


ROS Limited/Unobtainable:  Yes


Respiratory:  Reports: other - desaturated started on BIPAP


Allergies:  


Coded Allergies:  


     No Known Allergies (Unverified , 7/6/20)





Objective





Last 24 Hour Vital Signs








  Date Time  Temp Pulse Resp B/P (MAP) Pulse Ox O2 Delivery O2 Flow Rate FiO2


 


7/12/20 11:16  141 24  92   50


 


7/12/20 09:00  143  144/83    


 


7/12/20 09:00      Bi-pap  


 


7/12/20 08:55  143 32  96 Bi-Pap  50


 


7/12/20 08:53  143 32  96   50


 


7/12/20 08:20  154  144/83    


 


7/12/20 08:00 98.1 140 36 144/83 (103) 87   


 


7/12/20 08:00     100 Non-Rebreather 2.0 28


 


7/12/20 07:45  148      


 


7/12/20 04:00 99.1 90 21 109/63 (78) 100   


 


7/12/20 04:00  89      


 


7/12/20 00:00 99.5 95 22 117/65 (82) 99   


 


7/12/20 00:00  86      


 


7/11/20 21:00      Nasal Cannula 2.0 


 


7/11/20 20:48 100.1       


 


7/11/20 20:19  127  154/91    


 


7/11/20 20:00  115      


 


7/11/20 20:00 100.9 127 23 154/91 (112) 98   


 


7/11/20 19:58     98 Nasal Cannula 2.0 28


 


7/11/20 16:00 98.0 109 20 156/87 (110) 97   


 


7/11/20 16:00  111      








Height (Feet):  6


Weight (Pounds):  179


HEENT:  mucous membranes moist


Respiratory/Chest:  decreased breath sounds, other - on BIPAP


Cardiovascular:  tachycardia


Abdomen:  soft, non tender, other - NG tube


Neurologic/Psychiatric:  unresponsiveness





Laboratory Tests








Test


  7/11/20


13:05 7/11/20


17:02 7/11/20


17:03 7/11/20


20:22


 


POC Whole Blood Glucose


  146 MG/DL


()  H 196 MG/DL


()  H 165 MG/DL


()  H 145 MG/DL


()  H


 


Test


  7/12/20


05:54 7/12/20


08:00 7/12/20


08:05 


 


 


POC Whole Blood Glucose


  189 MG/DL


()  H 


  


  


 


 


Arterial Blood pH


  


  7.404


(7.350-7.450) 


  


 


 


Arterial Blood Partial


Pressure CO2 


  28.6 mmHg


(35.0-45.0)  L 


  


 


 


Arterial Blood Partial


Pressure O2 


  75.0 mmHg


(75.0-100.0) 


  


 


 


Arterial Blood HCO3


  


  21.5 mmol/L


(22.0-26.0)  L 


  


 


 


Arterial Blood Oxygen


Saturation 


  94.7 %


()  L 


  


 


 


Arterial Blood Base Excess  -0.7 (-2-2)    


 


Alexandr Test  Positive    


 


White Blood Count


  


  


  12.2 K/UL


(4.8-10.8)  #H 


 


 


Red Blood Count


  


  


  4.23 M/UL


(4.70-6.10)  L 


 


 


Hemoglobin


  


  


  13.0 G/DL


(14.2-18.0)  L 


 


 


Hematocrit


  


  


  40.1 %


(42.0-52.0)  L 


 


 


Mean Corpuscular Volume   95 FL (80-99)   


 


Mean Corpuscular Hemoglobin


  


  


  30.8 PG


(27.0-31.0) 


 


 


Mean Corpuscular Hemoglobin


Concent 


  


  32.5 G/DL


(32.0-36.0) 


 


 


Red Cell Distribution Width


  


  


  13.0 %


(11.6-14.8) 


 


 


Platelet Count


  


  


  559 K/UL


(150-450)  H 


 


 


Mean Platelet Volume


  


  


  4.9 FL


(6.5-10.1)  L 


 


 


Neutrophils (%) (Auto)


  


  


  78.1 %


(45.0-75.0)  H 


 


 


Lymphocytes (%) (Auto)


  


  


  13.7 %


(20.0-45.0)  L 


 


 


Monocytes (%) (Auto)


  


  


  5.2 %


(1.0-10.0) 


 


 


Eosinophils (%) (Auto)


  


  


  2.3 %


(0.0-3.0) 


 


 


Basophils (%) (Auto)


  


  


  0.6 %


(0.0-2.0) 


 


 


Sodium Level


  


  


  135 MMOL/L


(136-145)  L 


 


 


Potassium Level


  


  


  4.3 MMOL/L


(3.5-5.1) 


 


 


Chloride Level


  


  


  98 MMOL/L


() 


 


 


Carbon Dioxide Level


  


  


  26 MMOL/L


(21-32) 


 


 


Anion Gap


  


  


  11 mmol/L


(5-15) 


 


 


Blood Urea Nitrogen


  


  


  12 mg/dL


(7-18) 


 


 


Creatinine


  


  


  0.9 MG/DL


(0.55-1.30) 


 


 


Estimat Glomerular Filtration


Rate 


  


  > 60 mL/min


(>60) 


 


 


Glucose Level


  


  


  86 MG/DL


() 


 


 


Calcium Level


  


  


  8.9 MG/DL


(8.5-10.1)  # 


 


 


Magnesium Level


  


  


  1.9 MG/DL


(1.8-2.4) 


 


 


Total Bilirubin


  


  


  0.4 MG/DL


(0.2-1.0) 


 


 


Aspartate Amino Transf


(AST/SGOT) 


  


  25 U/L (15-37)


  


 


 


Alanine Aminotransferase


(ALT/SGPT) 


  


  28 U/L (12-78)


  


 


 


Alkaline Phosphatase


  


  


  227 U/L


()  H 


 


 


Pro-B-Type Natriuretic Peptide


  


  


  1484 pg/mL


(0-125)  H 


 


 


Total Protein


  


  


  7.1 G/DL


(6.4-8.2) 


 


 


Albumin


  


  


  2.0 G/DL


(3.4-5.0)  L 


 


 


Globulin   5.1 g/dL   


 


Albumin/Globulin Ratio


  


  


  0.4 (1.0-2.7)


L 


 











Current Medications








 Medications


  (Trade)  Dose


 Ordered  Sig/Aron


 Route


 PRN Reason  Start Time


 Stop Time Status Last Admin


Dose Admin


 


 Acetaminophen


  (Tylenol)  650 mg  Q4H  PRN


 ORAL


 Temp >100.5  7/11/20 20:15


 8/10/20 20:14  7/11/20 20:18


 


 


 Barium Sulfate


  (Varibar Honey)  250 ml  NOW  PRN


 MC


 RAD  7/10/20 13:45


 7/13/20 13:40   


 


 


 Barium Sulfate


  (Varibar Nectar)  240 ml  NOW  PRN


 MC


 RAD  7/10/20 13:45


 7/13/20 13:40   


 


 


 Barium Sulfate


  (Varibar Pudding)  230 ml  NOW  PRN


 MC


 RAD  7/10/20 13:45


 7/13/20 13:40   


 


 


 Dextrose


  (Dextrose 50%)  25 ml  Q30M  PRN


 IV


 Hypoglycemia  7/10/20 03:45


 10/5/20 09:44   


 


 


 Dextrose


  (Dextrose 50%)  50 ml  Q30M  PRN


 IV


 Hypoglycemia  7/10/20 03:45


 10/5/20 09:44   


 


 


 Finasteride


  (Proscar)  5 mg  DAILY


 ORAL


   7/12/20 09:00


 10/10/20 08:59   


 


 


 Insulin Aspart


  (NovoLOG)    BEFORE MEALS AND  HS


 SUBQ


   7/10/20 06:30


 10/5/20 11:29  7/12/20 05:58


 


 


 Iron Sucrose 100


 mg/Sodium Chloride  60 ml @ 


 240 mls/hr  BEDTIME


 IV


   7/10/20 21:00


 7/12/20 21:14  7/11/20 20:22


 


 


 Metoprolol


 Tartrate


  (Lopressor)  50 mg  Q12HR


 ORAL


   7/11/20 21:00


 10/9/20 20:59  7/11/20 20:19


 


 


 Pantoprazole


  (Protonix)  40 mg  DAILY


 IVP


   7/10/20 09:00


 8/5/20 08:59  7/12/20 09:00


 


 


 Piperacillin Sod/


 Tazobactam Sod


 3.375 gm/Sodium


 Chloride  110 ml @ 


 27.5 mls/hr  EVERY 8  HOURS


 IVPB


   7/10/20 06:00


 7/13/20 10:29  7/12/20 05:57


 


 


 Tamsulosin HCl


  (Flomax)  0.4 mg  BEDTIME


 ORAL


   7/11/20 21:00


 8/10/20 20:59  7/11/20 20:18


 

















Keith Morris MD Jul 12, 2020 12:52

## 2020-07-12 NOTE — DIAGNOSTIC IMAGING REPORT
EXAM:

  XR Chest, 1 View

 

CLINICAL HISTORY:

  SOB

 

TECHNIQUE:

  Frontal view of the chest.

 

COMPARISON:

  Chest x-ray 7/10/20

 

FINDINGS:

  Lungs:  Hyperinflated lungs of COPD.  Bilateral airspace opacities are 

more prominent in the perihilar region than prior study.  Similar 

scarring in the right lung apex.

  Pleural space:  Unremarkable.  No pneumothorax.

  Heart:  Unremarkable.  No cardiomegaly.

  Mediastinum:  Unremarkable.

  Bones/joints:  Unremarkable.

 

IMPRESSION:     

1.  Hyperinflated lungs of COPD.

2.  Bilateral airspace opacities are more prominent in the perihilar 

regions than prior study.

## 2020-07-12 NOTE — PULMONOLOGY PROGRESS NOTE
Subjective


ROS Limited/Unobtainable:  Yes


Constitutional:  Denies: fever


Allergies:  


Coded Allergies:  


     No Known Allergies (Unverified , 7/6/20)


All Systems:  reviewed and negative except above


Subjective


overnight events reviewed


care noted


tachycardic and dyspneic


plans to dc antibiotics noted


no distress at present





Objective





Last 24 Hour Vital Signs








  Date Time  Temp Pulse Resp B/P (MAP) Pulse Ox O2 Delivery O2 Flow Rate FiO2


 


7/12/20 04:00 99.1 90 21 109/63 (78) 100   


 


7/12/20 04:00  89      


 


7/12/20 00:00 99.5 95 22 117/65 (82) 99   


 


7/12/20 00:00  86      


 


7/11/20 21:00      Nasal Cannula 2.0 


 


7/11/20 20:48 100.1       


 


7/11/20 20:19  127  154/91    


 


7/11/20 20:00  115      


 


7/11/20 20:00 100.9 127 23 154/91 (112) 98   


 


7/11/20 19:58     98 Nasal Cannula 2.0 28


 


7/11/20 16:00 98.0 109 20 156/87 (110) 97   


 


7/11/20 16:00  111      


 


7/11/20 12:00  91      


 


7/11/20 11:38 98.1 93 18 133/65 (87) 96   


 


7/11/20 09:00      Nasal Cannula 2.0 


 


7/11/20 08:54  102  136/74    


 


7/11/20 08:00  103      


 


7/11/20 08:00 97.5 102 20 136/74 (94) 95   

















Intake and Output  


 


 7/11/20 7/12/20





 19:00 07:00


 


Intake Total 720 ml 550 ml


 


Output Total 1400 ml 1890 ml


 


Balance -680 ml -1340 ml


 


  


 


Free Water 120 ml 


 


Tube Feeding 600 ml 550 ml


 


Output Urine Total 1400 ml 1890 ml


 


# Bowel Movements 2 








Objective


WDWN


NAD


reduced  breath sounds bilaterally without rhonchi or wheeze


[id number] tachy without MRG


NABS nontender no HSM


no CCE


nonfocal








Laboratory Tests


7/11/20 13:05: POC Whole Blood Glucose 146H


7/11/20 17:02: POC Whole Blood Glucose 196H


7/11/20 17:03: POC Whole Blood Glucose 165H


7/11/20 20:22: POC Whole Blood Glucose 145H


7/12/20 05:54: POC Whole Blood Glucose 189H





Current Medications








 Medications


  (Trade)  Dose


 Ordered  Sig/Aron


 Route


 PRN Reason  Start Time


 Stop Time Status Last Admin


Dose Admin


 


 Acetaminophen


  (Tylenol)  650 mg  Q4H  PRN


 ORAL


 Temp >100.5  7/11/20 20:15


 8/10/20 20:14  7/11/20 20:18


 


 


 Barium Sulfate


  (Varibar Honey)  250 ml  NOW  PRN


 MC


 RAD  7/10/20 13:45


 7/13/20 13:40   


 


 


 Barium Sulfate


  (Varibar Nectar)  240 ml  NOW  PRN


 MC


 RAD  7/10/20 13:45


 7/13/20 13:40   


 


 


 Barium Sulfate


  (Varibar Pudding)  230 ml  NOW  PRN


 MC


 RAD  7/10/20 13:45


 7/13/20 13:40   


 


 


 Dextrose


  (Dextrose 50%)  25 ml  Q30M  PRN


 IV


 Hypoglycemia  7/10/20 03:45


 10/5/20 09:44   


 


 


 Dextrose


  (Dextrose 50%)  50 ml  Q30M  PRN


 IV


 Hypoglycemia  7/10/20 03:45


 10/5/20 09:44   


 


 


 Finasteride


  (Proscar)  5 mg  DAILY


 ORAL


   7/12/20 09:00


 10/10/20 08:59   


 


 


 Insulin Aspart


  (NovoLOG)    BEFORE MEALS AND  HS


 SUBQ


   7/10/20 06:30


 10/5/20 11:29  7/12/20 05:58


 


 


 Iron Sucrose 100


 mg/Sodium Chloride  60 ml @ 


 240 mls/hr  BEDTIME


 IV


   7/10/20 21:00


 7/12/20 21:14  7/11/20 20:22


 


 


 Metoprolol


 Tartrate


  (Lopressor)  50 mg  Q12HR


 ORAL


   7/11/20 21:00


 10/9/20 20:59  7/11/20 20:19


 


 


 Pantoprazole


  (Protonix)  40 mg  DAILY


 IVP


   7/10/20 09:00


 8/5/20 08:59  7/11/20 08:55


 


 


 Piperacillin Sod/


 Tazobactam Sod


 3.375 gm/Sodium


 Chloride  110 ml @ 


 27.5 mls/hr  EVERY 8  HOURS


 IVPB


   7/10/20 06:00


 7/13/20 10:29  7/12/20 05:57


 


 


 Tamsulosin HCl


  (Flomax)  0.4 mg  BEDTIME


 ORAL


   7/11/20 21:00


 8/10/20 20:59  7/11/20 20:18


 











Assessment/Plan


Assessment/Plan





IMPRESSION:


1. Respiratory failure. 


2. Acute hypoxemia.


3. Significant pneumonia, negative COVID.


4. Severe protein-calorie malnutrition.


5. Elevated alkaline phosphatase, 


6. Significant anemia likely of chronic disease.


7. BPH.


8. Pleural effusion 


9. Tachyarrythmia





PLAN


diltiazem


BIPAP


ABG


CXR and venous US


care noted


imaging reviewed and will monitor for change


PRN oxygen 


respiratory care 


on isolation 


DVT prophylaxis


will follow up 


dc antibiotics today 


impression, plan, and exam edited and reviewed in detail


care discussed with Cesar Winn MD Jul 12, 2020 07:44

## 2020-07-12 NOTE — PROGRESS NOTE
DATE:  07/12/2020

CARDIOLOGY PROGRESS NOTE



SUBJECTIVE:  The patient had fevers last night.  He is tachycardic and

tachypneic this morning.  Case was discussed with the primary attending.

The patient was given antipyretics and re-cultured last night.



PHYSICAL EXAMINATION:

LUNGS:  Bilateral breath sounds.  Rhonchi.

HEART:  Regular rhythm.  Rapid rate.  Normal S1, S2.  Monitor sinus

tachycardia.

ABDOMEN:  Slightly distended, but soft.

EXTREMITIES:  No edema.



LABORATORY DATA:  ABG this morning 7.40, 29, and 75.  White count 12 and

hemoglobin 13.  Sodium 135, potassium 4.3, bicarb 26, BUN 12, creatinine

0.9, and magnesium 1.9.  Pro natriuretic peptide has slightly decreased to

1484.  Chest x-ray reveals increased perihilar upper lobe infiltrate.

Abdominal x-ray is notable for slightly increased gaseous small bowel

loops.



IMPRESSION:

1. Respiratory failure, possible aspiration and worsening pneumonia.

2. Sinus tachycardia.

3. Fevers due to sepsis.

4. Recent bowel perforation, recovered.



PLAN:

1. Antimicrobials.

2. BiPAP support.

3. Respiratory hygiene.

4. Cardiac monitoring.

5. Hold feedings.

6. The patient has advanced directives, but no CPR but intubation

okay.

7. Current ABG does not warrant intubation presently.









  ______________________________________________

  Theron Mckenzie M.D.





DR:  JOSE FRANCISCO

D:  07/12/2020 17:00

T:  07/12/2020 18:13

JOB#:  [id number]

CC:

## 2020-07-12 NOTE — EMERGENCY ROOM REPORT
Physical Exam


Call to the 2 E. for CODE BLUE.


Patient was on BiPAP with a heart rate of 140.


His heart rate decreased to the 40s.


A code was called.  He is a no CPR but intubate.


When I arrived epinephrine 1 amp had already been given.


Last 24 Hour Vital Signs








  Date Time  Temp Pulse Resp B/P (MAP) Pulse Ox O2 Delivery O2 Flow Rate FiO2


 


20 12:00 100.8 143 18 102/60 (74) 92   


 


20 11:36  140      


 


20 11:16  141 24  92   50


 


20 09:00  143  144/83    


 


20 09:00      Bi-pap  


 


20 08:55  143 32  96 Bi-Pap  50


 


20 08:53  143 32  96   50


 


20 08:20  154  144/83    


 


20 08:00 98.1 140 36 144/83 (103) 87   


 


20 08:00     100 Non-Rebreather 2.0 28


 


20 07:45  148      


 


20 04:00 99.1 90 21 109/63 (78) 100   


 


20 04:00  89      


 


20 00:00 99.5 95 22 117/65 (82) 99   


 


20 00:00  86      


 


20 21:00      Nasal Cannula 2.0 


 


20 20:48 100.1       


 


20 20:19  127  154/91    


 


20 20:00  115      


 


20 20:00 100.9 127 23 154/91 (112) 98   


 


20 19:58     98 Nasal Cannula 2.0 28


 


20 16:00 98.0 109 20 156/87 (110) 97   


 


20 16:00  111      








Sp02 EP Interpretation:  reviewed, abnormal - Interpreted as low by me


General Appearance:  thin, other, Chronically Ill


Eyes:  bilateral eye other


Neck:  other - Not moving head


Respiratory:  lungs clear, other - Assisted ventilations with bag valve mask


Cardiovascular #1:  bradycardia - No pulses


Cardiovascular #2:  0 femoral (R) - Except with CPR


Gastrointestinal:  scaphoid


Musculoskeletal:  other - Flaccid and atrophy


Neurologic:  other - Unresponsive


Psychiatric:  other - Unresponsive


Skin:  cyanosis, mottled - Poor turgor


CPR/Code Blue


CPR/Code Blue Narrative


Patient was being bagged but because of the no chest compressions in order no 

and CPR was being performed.  Epinephrine 1 amp had already been given.


Patient had a heart rate of 50 on the monitor.  No pulses


Atropine was given.


Patient became asystolic.  We were setting up to intubate at that time.


There were problems with the monitor only registering 1-lead.


Epinephrine was repeated at this time.


The patient was intubated with good breath sounds and color change.


The patient remained in asystole and epinephrine was repeated.


Resuscitation efforts were halted at 1431 and the patient was pronounced at 

that time.  The final rhythm was asystole.





Intubation


Intubation :  


   Consent:  Emergent


   Intubation Method:  orotracheal


   Tube Size (cm):  7.5


   Medications:  Other - None


   Breath Sounds after Intubation:  equal


   Intubation Complications:  no complications


   Post Intubation Xray:  No


   Attempts:  One


   Patient Tolerated:  Well





Medical Decision Making


Diagnostic Impression:  


 Primary Impression:  


 Cardiopulmonary arrest


 Additional Impression:  


 Respiratory arrest


ER Course


Patient became bradycardic on BiPAP.


A CODE BLUE was called.


Assisted ventilations were provided with a bag valve mask.  The patient had 

orders for no chest compressions.


Epinephrine had been given prior to my arrival.


No compressions were performed per CODE STATUS.


After ACLS protocol followed per medications the patient remained in asystole.


Patient was pronounced at 1431.


Rhythm Strip Diag. Results


EP Interpretation:  yes


Rhythm:  other - Asystole


Status:  worsened


Disposition:  


Condition:  


Referrals:  


NOT CHOSEN IPA/MD,REFERRING (PCP)











Theron Kendall MD 2020 14:52

## 2020-07-12 NOTE — SURGERY PROGRESS NOTE
Surgery Progress Note


Subjective


Additional Comments


leukocytosis


afebrile


HD stable


respiratory stable CXR noted with hilar 


no n/v





Objective





Last 24 Hour Vital Signs








  Date Time  Temp Pulse Resp B/P (MAP) Pulse Ox O2 Delivery O2 Flow Rate FiO2


 


7/12/20 09:00  143  144/83    


 


7/12/20 09:00      Bi-pap  


 


7/12/20 08:55  143 32  96 Bi-Pap  50


 


7/12/20 08:53  143 32  96   50


 


7/12/20 08:20  154  144/83    


 


7/12/20 08:00 98.1 140 36 144/83 (103) 87   


 


7/12/20 08:00     100 Non-Rebreather 2.0 28


 


7/12/20 04:00 99.1 90 21 109/63 (78) 100   


 


7/12/20 04:00  89      


 


7/12/20 00:00 99.5 95 22 117/65 (82) 99   


 


7/12/20 00:00  86      


 


7/11/20 21:00      Nasal Cannula 2.0 


 


7/11/20 20:48 100.1       


 


7/11/20 20:19  127  154/91    


 


7/11/20 20:00  115      


 


7/11/20 20:00 100.9 127 23 154/91 (112) 98   


 


7/11/20 19:58     98 Nasal Cannula 2.0 28


 


7/11/20 16:00 98.0 109 20 156/87 (110) 97   


 


7/11/20 16:00  111      


 


7/11/20 12:00  91      


 


7/11/20 11:38 98.1 93 18 133/65 (87) 96   








I&O











Intake and Output  


 


 7/11/20 7/12/20





 19:00 07:00


 


Intake Total 720 ml 550 ml


 


Output Total 1400 ml 1890 ml


 


Balance -680 ml -1340 ml


 


  


 


Free Water 120 ml 


 


Tube Feeding 600 ml 550 ml


 


Output Urine Total 1400 ml 1890 ml


 


# Bowel Movements 2 








Dressing:  saturated


Wound:  clean


Cardiovascular:  RSR


Respiratory:  clear, decreased breath sounds


Abdomen:  soft, non-tender, present bowel sounds


Extremities:  no tenderness, no cyanosis





Laboratory Tests








Test


  7/11/20


13:05 7/11/20


17:02 7/11/20


17:03 7/11/20


20:22


 


POC Whole Blood Glucose


  146 MG/DL


()  H 196 MG/DL


()  H 165 MG/DL


()  H 145 MG/DL


()  H


 


Test


  7/12/20


05:54 7/12/20


08:00 7/12/20


08:05 


 


 


POC Whole Blood Glucose


  189 MG/DL


()  H 


  


  


 


 


Arterial Blood pH


  


  7.404


(7.350-7.450) 


  


 


 


Arterial Blood Partial


Pressure CO2 


  28.6 mmHg


(35.0-45.0)  L 


  


 


 


Arterial Blood Partial


Pressure O2 


  75.0 mmHg


(75.0-100.0) 


  


 


 


Arterial Blood HCO3


  


  21.5 mmol/L


(22.0-26.0)  L 


  


 


 


Arterial Blood Oxygen


Saturation 


  94.7 %


()  L 


  


 


 


Arterial Blood Base Excess  -0.7 (-2-2)    


 


Alexandr Test  Positive    


 


White Blood Count


  


  


  12.2 K/UL


(4.8-10.8)  #H 


 


 


Red Blood Count


  


  


  4.23 M/UL


(4.70-6.10)  L 


 


 


Hemoglobin


  


  


  13.0 G/DL


(14.2-18.0)  L 


 


 


Hematocrit


  


  


  40.1 %


(42.0-52.0)  L 


 


 


Mean Corpuscular Volume   95 FL (80-99)   


 


Mean Corpuscular Hemoglobin


  


  


  30.8 PG


(27.0-31.0) 


 


 


Mean Corpuscular Hemoglobin


Concent 


  


  32.5 G/DL


(32.0-36.0) 


 


 


Red Cell Distribution Width


  


  


  13.0 %


(11.6-14.8) 


 


 


Platelet Count


  


  


  559 K/UL


(150-450)  H 


 


 


Mean Platelet Volume


  


  


  4.9 FL


(6.5-10.1)  L 


 


 


Neutrophils (%) (Auto)


  


  


  78.1 %


(45.0-75.0)  H 


 


 


Lymphocytes (%) (Auto)


  


  


  13.7 %


(20.0-45.0)  L 


 


 


Monocytes (%) (Auto)


  


  


  5.2 %


(1.0-10.0) 


 


 


Eosinophils (%) (Auto)


  


  


  2.3 %


(0.0-3.0) 


 


 


Basophils (%) (Auto)


  


  


  0.6 %


(0.0-2.0) 


 


 


Sodium Level


  


  


  135 MMOL/L


(136-145)  L 


 


 


Potassium Level


  


  


  4.3 MMOL/L


(3.5-5.1) 


 


 


Chloride Level


  


  


  98 MMOL/L


() 


 


 


Carbon Dioxide Level


  


  


  26 MMOL/L


(21-32) 


 


 


Anion Gap


  


  


  11 mmol/L


(5-15) 


 


 


Blood Urea Nitrogen


  


  


  12 mg/dL


(7-18) 


 


 


Creatinine


  


  


  0.9 MG/DL


(0.55-1.30) 


 


 


Estimat Glomerular Filtration


Rate 


  


  > 60 mL/min


(>60) 


 


 


Glucose Level


  


  


  86 MG/DL


() 


 


 


Calcium Level


  


  


  8.9 MG/DL


(8.5-10.1)  # 


 


 


Magnesium Level


  


  


  1.9 MG/DL


(1.8-2.4) 


 


 


Total Bilirubin


  


  


  0.4 MG/DL


(0.2-1.0) 


 


 


Aspartate Amino Transf


(AST/SGOT) 


  


  25 U/L (15-37)


  


 


 


Alanine Aminotransferase


(ALT/SGPT) 


  


  28 U/L (12-78)


  


 


 


Alkaline Phosphatase


  


  


  227 U/L


()  H 


 


 


Pro-B-Type Natriuretic Peptide


  


  


  1484 pg/mL


(0-125)  H 


 


 


Total Protein


  


  


  7.1 G/DL


(6.4-8.2) 


 


 


Albumin


  


  


  2.0 G/DL


(3.4-5.0)  L 


 


 


Globulin   5.1 g/dL   


 


Albumin/Globulin Ratio


  


  


  0.4 (1.0-2.7)


L 


 











Plan


Problems:  


(1) Anemia


(2) Sepsis


Assessment & Plan:  febrile, tachycardic, abnormal labs, sob, respiratory 

decline


covid neg in ED


recent colonoscopy with perf per report treated conservatively


abd exam limited given medical condition but seemingly benign


non distended, no pain upon exam





CXR noted.  


labs reviewed





KUB pending 


does not seem to be having acute abdominal process but concerning and ddx given 

recent history


no acute surgical intervention planned


will follow with serial exams and recs


thank you 





KUFORD noted.. . likely ileus


repeat in AM


hold feeds for now


IV fluids


discussed with team 





KUFORD reviewed improved ileus improved


Tube feeds resumed will monitor residuals and tolerance





+BM


tube feeds as tolerated 





Abx pna?


trend labs





(3) Acute respiratory failure with hypoxia


(4) HCAP (healthcare-associated pneumonia)


(5) Fever


(6) Shortness of breath


(7) Tachycardia


(8) Suspected COVID-19 virus infection











Anthony Dias Jul 12, 2020 10:01

## 2020-07-12 NOTE — GENERAL PROGRESS NOTE
Assessment/Plan


Problem List:  


(1) Bowel perforation


ICD Codes:  K63.1 - Perforation of intestine (nontraumatic)


SNOMED:  07582050


(2) Anemia


ICD Codes:  D64.9 - Anemia, unspecified


SNOMED:  036116365, 942926837


Qualifiers:  


   Qualified Codes:  D64.9 - Anemia, unspecified


(3) Sepsis


ICD Codes:  A41.9 - Sepsis, unspecified organism


SNOMED:  75448331, 886350492


Qualifiers:  


   Qualified Codes:  A41.9 - Sepsis, unspecified organism; R65.20 - Severe 

sepsis without septic shock; J96.01 - Acute respiratory failure with hypoxia


(4) Tachycardia


ICD Codes:  R00.0 - Tachycardia, unspecified


SNOMED:  2780782


(5) Fever


ICD Codes:  R50.9 - Fever, unspecified


SNOMED:  143580815


(6) HCAP (healthcare-associated pneumonia)


ICD Codes:  J18.9 - Pneumonia, unspecified organism


SNOMED:  180544335, 528180775


(7) Acute respiratory failure with hypoxia


ICD Codes:  J96.01 - Acute respiratory failure with hypoxia


SNOMED:  05374621, 927698548


Status:  stable


Assessment/Plan:


dc feeds


follow up cxr and labs


bipap


resp care


o2


dvt/stress ulcer prophylaxis


check duplex


?PE


cont iv abx per id


tenuous


d/w cards x 5 mins





Subjective


ROS Limited/Unobtainable:  No


Constitutional:  Reports: malaise, weakness


HEENT:  Reports: no symptoms


Cardiovascular:  Reports: no symptoms


Respiratory:  Reports: shortness of breath


Gastrointestinal/Abdominal:  Reports: no symptoms


Genitourinary:  Reports: no symptoms


Neurologic/Psychiatric:  Reports: depressed


Endocrine:  Reports: no symptoms


Hematologic/Lymphatic:  Reports: no symptoms


Allergies:  


Coded Allergies:  


     No Known Allergies (Unverified , 7/6/20)


All Systems:  reviewed and negative except above


Subjective


suddenly more tachypneic and tachycardic. had fevers last night. no reports of 

vomiting. Low on o2 on abg-100%NRB currently. labs and cxr are currently 

pending. on iv abx. alert.





Objective





Last 24 Hour Vital Signs








  Date Time  Temp Pulse Resp B/P (MAP) Pulse Ox O2 Delivery O2 Flow Rate FiO2


 


7/12/20 08:20  154  144/83    


 


7/12/20 08:00     100 Non-Rebreather 2.0 28


 


7/12/20 04:00 99.1 90 21 109/63 (78) 100   


 


7/12/20 04:00  89      


 


7/12/20 00:00 99.5 95 22 117/65 (82) 99   


 


7/12/20 00:00  86      


 


7/11/20 21:00      Nasal Cannula 2.0 


 


7/11/20 20:48 100.1       


 


7/11/20 20:19  127  154/91    


 


7/11/20 20:00  115      


 


7/11/20 20:00 100.9 127 23 154/91 (112) 98   


 


7/11/20 19:58     98 Nasal Cannula 2.0 28


 


7/11/20 16:00 98.0 109 20 156/87 (110) 97   


 


7/11/20 16:00  111      


 


7/11/20 12:00  91      


 


7/11/20 11:38 98.1 93 18 133/65 (87) 96   


 


7/11/20 09:00      Nasal Cannula 2.0 


 


7/11/20 08:54  102  136/74    

















Intake and Output  


 


 7/11/20 7/12/20





 19:00 07:00


 


Intake Total 720 ml 550 ml


 


Output Total 1400 ml 1890 ml


 


Balance -680 ml -1340 ml


 


  


 


Free Water 120 ml 


 


Tube Feeding 600 ml 550 ml


 


Output Urine Total 1400 ml 1890 ml


 


# Bowel Movements 2 








Laboratory Tests


7/11/20 13:05: POC Whole Blood Glucose 146H


7/11/20 17:02: POC Whole Blood Glucose 196H


7/11/20 17:03: POC Whole Blood Glucose 165H


7/11/20 20:22: POC Whole Blood Glucose 145H


7/12/20 05:54: POC Whole Blood Glucose 189H


7/12/20 08:00: 


Arterial Blood pH 7.404, Arterial Blood Partial Pressure CO2 28.6L, Arterial 

Blood Partial Pressure O2 75.0, Arterial Blood HCO3 21.5L, Arterial Blood 

Oxygen Saturation 94.7L, Arterial Blood Base Excess -0.7, Alexandr Test Positive


7/12/20 08:05: 


White Blood Count [Pending], Red Blood Count [Pending], Hemoglobin [Pending], 

Hematocrit [Pending], Mean Corpuscular Volume [Pending], Mean Corpuscular 

Hemoglobin [Pending], Mean Corpuscular Hemoglobin Concent [Pending], Red Cell 

Distribution Width [Pending], Platelet Count [Pending], Mean Platelet Volume [

Pending], Neutrophils (%) (Auto) [Pending], Lymphocytes (%) (Auto) [Pending], 

Monocytes (%) (Auto) [Pending], Eosinophils (%) (Auto) [Pending], Basophils (%) 

(Auto) [Pending], Sodium Level [Pending], Potassium Level [Pending], Chloride 

Level [Pending], Carbon Dioxide Level [Pending], Blood Urea Nitrogen [Pending], 

Creatinine [Pending], Estimat Glomerular Filtration Rate [Pending], Glucose 

Level [Pending], Calcium Level [Pending], Magnesium Level [Pending], Total 

Bilirubin [Pending], Aspartate Amino Transf (AST/SGOT) [Pending], Alanine 

Aminotransferase (ALT/SGPT) [Pending], Alkaline Phosphatase [Pending], Pro-B-

Type Natriuretic Peptide [Pending], Total Protein [Pending], Albumin [Pending], 

Globulin [Pending]


Height (Feet):  6


Weight (Pounds):  179


Objective


General Appearance:  WD/WN, lethargic, confused, cachetic. tachypneic- mod to 

severe distress


EENT:  PERRL/EOMI, normal ENT inspection


Neck:  non-tender, normal alignment


Cardiovascular:  normal rate, regular rhythm


Respiratory/Chest:  + respiratory distress, + accessory muscle use, rhonchi - 

bilaterally


Abdomen:  normal bowel sounds, non tender, soft, no organomegaly, no mass


Edema:  no edema noted Arm (L), no edema noted Arm (R), no edema noted Leg (L), 

no edema noted Leg (R), no edema noted Pedal (L), no edema noted Pedal (R), no 

edema noted Generalized


Neurologic:  disoriented, aphasia


Skin:  normal pigmentation


Lymphatic:  normal anterior cervical (L), normal anterior cervical (R)











Vel Boudreaux MD Jul 12, 2020 08:38

## 2020-07-13 NOTE — DISCHARGE SUMMARY
Discharge Summary


Discharge Summary


_


DEATH SUMMARY





DATE OF ADMISSION: 7/6/2020


DATE OF DISCHARGE: 7/12/2020








 


REASON FOR ADMISSION: 


5 years old male, resident of skilled nursing facility, with past medical 

history of COPD, hypertension, diabetes mellitus, was sent for evaluation due 

to shortness of breath and tachycardia.  Onset started prior to according to 

paramedics.  Patient by himself unable to provide all the information.  Per 

paramedic patient was tachycardic and hypoxic.  No reported cough or fever.  No 

nausea or vomiting.  Patient was hypoxic patient was placed on on nonrebreather 

mask and brought to ED for evaluation upon evaluation patient was febrile 

100.6101 and then later 103.5 tachycardic with a heart rate 143 tachypneic with 

respiratory rate 43 pulse oximetry was 97% on 100% nonrebreather mask.


Laboratory work-up revealed no leukocytosis hemoglobin 8.5 hematocrit 25.4 

platelet count 611.


Sodium 133, BUN 22, creatinine 0.9.  Glucose 233.  Total  troponin 

negative lactic acid 1.4 ABG and nonrebreathing mask were stable urinalysis 

revealed no evidence of urinary tract infection +1 protein chest x-ray 

demonstrated severe emphysematous changes with hyperinflation.  Airspace 

opacity within the bilateral lower lobes and right upper lobe suspicious for 

superimposed multifocal infiltrates.  No pneumothorax.  No definite pleural 

effusion.  Heart was enlarged.  Aorta was markedly tortuous.  Patient was swab 

for COVID-19 septic work-up initiated patient admitted for acute respiratory 

failure suspected COVID-19 sepsis pneumonia anemia


 


CONSULTANTS:


cardiologist 


pulmonary Dr. Calvo


ID specialist Dr. Vale


surgery Dr. Dias 





 


[facility] COURSE: 


Patient admitted to telemetry floor.  


Patient started on broad-spectrum antibiotics and IV fluids.  


Cardiologist ,pulmonologist ,surgery and ID specialist followed.  


Overall prognosis remained poor.  


CODE STATUS was no chest compression





Supplemental oxygen provided and titrated to keep pulse oximetry above 90% .


Patient  initially remained on 100%  nonrebreathing mask , and then  was able 

to be weaned down to oxygen via nasal cannula.  


Patient was followed-up with ABG and chest x-ray.  


Patient was febrile .


Blood cultures were negative.  


COVID-19 x2 negative.  


Sputum culture was negative.  


Repeated blood culture also revealed no evidence of growth.


Patient was treated for pneumonia as per ID specialist recommendation.





Venous duplex bilateral lower extremity revealed no evidence of acute DVT.


DVT prophylaxis provided.


NG tube was placed for nutritional support.


Abdominal x-ray  confirmed placement of NG tube , but  noted prominent bowel 

loops , possibly representing mildly dilated small bowel versus nondilated 

colon.  


Most likely ileus ,but could represent small bowel obstruction.  


Surgeon closely followed.


Patient was kept n.p.o.  on  bowel rest


Follow-up KUB revealed improvement.  Surgeon cleared patient to start tube 

feeding .


Feeding resumed with close monitoring of feeding tolerance and residuals.  


Strict aspiration precaution maintained.


Tube feeding formula and protein supplements provided as per registered 

dietitian recommendation





Hemoglobin and hematocrit were closely monitored with goal to keep hemoglobin 

above 7.  


Patient undergone transfusion of 1 unit of packed red blood cells .


Stool for occult blood was positive.  


GI prophylaxis provided


 


Cardiovascular regimen was titrated to optimize volume status and blood 

pressure control.


IV fluids stopped , as patient was able to tolerate tube feeding


Renal parameters  and electrolytes were closely monitored ,electrolytes 

corrected as needed, and  nephrotoxic's were avoided .


Potassium and magnesium were replaced. 





On 7/12 patient suddenly became tachypneic and tachycardic , had fever 

overnight.  


No reported vomiting.  


Patient was hypoxic, and  placed back on 100% NRM . 


Tube feeding was held.


Abdominal x-ray revealed  that the feeding tube was curled in the fundus of the 

stomach.  


Gaseous small bowel loops in the abdomen were increased.  





Code blue was called on 7/12.


Patient was on the BiPAP at that time .


Patient is with no chest compression status, but intubation ok.  


Patient was orally intubated.  


ACLS medications provided.


Despite all resuscitative attempts,  patient remained in asystole.


Patient was pronounced on 7/7/2020 at 14: 31.  


Cause of death:  asystolic  cardiopulmonary arrest  








FINAL DIAGNOSES: 


s/p cardiopulmonary arrest 


Severe sepsis


Healthcare associated pneumonia


Possible aspiration


Acute hypoxemic respiratory failure


Paralytic ileus


Anemia, requiring blood transfusion 


Type 2 diabetes mellitus


Suspected COVID-19-  ruled out


Paroxysmal atrial fibrillation


Hypertensive heart disease


Dehydration


Hypokalemia 


Hypomagnesemia


Severe protein calorie malnutrition


BPH


Recent bowel perforation


Tachyarrhythmia


 





I have been assigned to dictate discharge summary for this account. 


I was not involved in the patient's management.











Kasia Gonzalez NP Jul 13, 2020 16:36